# Patient Record
Sex: FEMALE | Race: WHITE | NOT HISPANIC OR LATINO | Employment: UNEMPLOYED | URBAN - METROPOLITAN AREA
[De-identification: names, ages, dates, MRNs, and addresses within clinical notes are randomized per-mention and may not be internally consistent; named-entity substitution may affect disease eponyms.]

---

## 2018-04-11 ENCOUNTER — HOSPITAL ENCOUNTER (EMERGENCY)
Facility: HOSPITAL | Age: 43
Discharge: HOME | End: 2018-04-11
Attending: EMERGENCY MEDICINE

## 2018-04-11 VITALS
OXYGEN SATURATION: 99 % | HEART RATE: 83 BPM | SYSTOLIC BLOOD PRESSURE: 143 MMHG | BODY MASS INDEX: 23.92 KG/M2 | HEIGHT: 62 IN | RESPIRATION RATE: 18 BRPM | WEIGHT: 130 LBS | DIASTOLIC BLOOD PRESSURE: 92 MMHG | TEMPERATURE: 98 F

## 2018-04-11 DIAGNOSIS — M25.539 WRIST PAIN: ICD-10-CM

## 2018-04-11 DIAGNOSIS — S62.646A CLOSED NONDISPLACED FRACTURE OF PROXIMAL PHALANX OF RIGHT LITTLE FINGER, INITIAL ENCOUNTER: Primary | ICD-10-CM

## 2018-04-11 PROCEDURE — 99283 EMERGENCY DEPT VISIT LOW MDM: CPT | Mod: 25

## 2018-04-11 PROCEDURE — 26720 TREAT FINGER FRACTURE EACH: CPT

## 2018-04-11 PROCEDURE — 25000003 PHARM REV CODE 250: Performed by: NURSE PRACTITIONER

## 2018-04-11 PROCEDURE — 29130 APPL FINGER SPLINT STATIC: CPT

## 2018-04-11 RX ORDER — HYDROCODONE BITARTRATE AND ACETAMINOPHEN 5; 325 MG/1; MG/1
1 TABLET ORAL EVERY 4 HOURS PRN
Qty: 12 TABLET | Refills: 0 | Status: SHIPPED | OUTPATIENT
Start: 2018-04-11 | End: 2018-04-21

## 2018-04-11 RX ORDER — HYDROCODONE BITARTRATE AND ACETAMINOPHEN 5; 325 MG/1; MG/1
1 TABLET ORAL
Status: COMPLETED | OUTPATIENT
Start: 2018-04-11 | End: 2018-04-11

## 2018-04-11 RX ADMIN — HYDROCODONE BITARTRATE AND ACETAMINOPHEN 1 TABLET: 5; 325 TABLET ORAL at 12:04

## 2018-04-11 NOTE — ED TRIAGE NOTES
Pt reports falling last Thursday with immediate pain to R outer hand at base of 4th and 5th finger. Pt reports she has been taking ibuprofen with no relief with last ibuprofen 2 tablets this morning. Pt reports hitting 5th finger today with worsening pain. Normal temp/color/sensation to distal 5th finger. Pain with movement. Pt reports pain to R wrist as well. Slight swelling noted.

## 2018-04-11 NOTE — ED PROVIDER NOTES
Encounter Date: 4/11/2018    SCRIBE #1 NOTE: I, Jude Esquivel, am scribing for, and in the presence of,  Leann Peralta NP. I have scribed the entire note.       History     Chief Complaint   Patient presents with    Hand Injury     rt. 5 th finger pain  , fall last week        04/11/2018 11:15 AM     Chief complaint: Hand injury       Karen Gomez is a 43 y.o. female with a non-significant medical history who presents to the ED for a right hand pain with onset 6 days ago. Patient states that she landed on her right 5th finger status post mechanical fall from ground level. She relays that she landed on the hand and is having pain to the right wrist and hand that is worse with movement. Patient reports that she then felt a crack today to her right 4th finger. Patient states that she has diffuse swelling to the hand. Patient relays that the swelling is worse to the 5th finger. Patient denies sensory deficits, motor deficits, LOC, headache, SOB, chest pain, abdominal pain, and vomiting.                     The history is provided by the patient.     Review of patient's allergies indicates:  No Known Allergies  History reviewed. No pertinent past medical history.  Past Surgical History:   Procedure Laterality Date    FINGER SURGERY      left hand middle     History reviewed. No pertinent family history.  Social History   Substance Use Topics    Smoking status: Current Every Day Smoker     Packs/day: 1.00     Years: 12.00     Types: Cigarettes    Smokeless tobacco: Never Used    Alcohol use 1.2 oz/week     2 Glasses of wine per week     Review of Systems   Constitutional: Negative for chills and fever.   HENT: Negative for congestion, rhinorrhea and sore throat.    Eyes: Negative for pain and redness.   Respiratory: Negative for cough and shortness of breath.    Cardiovascular: Negative for chest pain and palpitations.   Gastrointestinal: Negative for abdominal pain, diarrhea and nausea.   Genitourinary: Negative  for dysuria, flank pain, frequency, hematuria and urgency.   Musculoskeletal: Positive for arthralgias (hand pain ). Negative for gait problem, myalgias and neck pain.   Skin: Negative for rash.   Neurological: Negative for dizziness, light-headedness and headaches.       Physical Exam     Initial Vitals [04/11/18 0958]   BP Pulse Resp Temp SpO2   (!) 143/92 83 18 98.1 °F (36.7 °C) 99 %      MAP       109         Physical Exam    Nursing note and vitals reviewed.  Constitutional: She appears well-developed and well-nourished. She is not diaphoretic. No distress.   HENT:   Head: Normocephalic and atraumatic.   Neck: Normal range of motion.   Cardiovascular: Normal rate, regular rhythm and normal heart sounds.   Pulmonary/Chest: Breath sounds normal. She has no wheezes. She has no rhonchi. She has no rales.   Abdominal: Soft. Bowel sounds are normal. There is no tenderness.   Musculoskeletal: Normal range of motion. She exhibits tenderness.   Right hand: Tenderness to palpation to the 5th digit and the MCP joint. Patient has decreased ROM secondary to pain.  Swelling noted to the 5th digit.   Unable to fully evaluate tendon function of 5th digit due to patient's pain.    Neurological: She is alert.   Skin: Skin is warm and dry. Capillary refill takes less than 2 seconds.   Psychiatric: She has a normal mood and affect.         ED Course   Procedures  Labs Reviewed - No data to display     Imaging Results    None            Medical Decision Making:   History:   Old Medical Records: I decided to obtain old medical records.  Clinical Tests:   Radiological Study: Ordered and Reviewed       APC / Resident Notes:   Karen Gomez is a 43 year old female presenting to the ED with c/o right 5th digit pain after a fall that occurred one week ago. Patient has decreased ROM to the digit due to pain. I am unable to fully evaluate tendon function and explained the possibility of tendon injury. Patient is noted to have a fracture  of the proximal 5th phalanx and will require orthopedic follow up. She was placed in a metal finger splint and her information was faxed to [facility] for referral. She was instructed to contact [facility] as well for follow up. Specific return precautions discussed and patient verbalized understanding. Based on my clinical evaluation, I do not appreciate any immediate, emergent, or life threatening condition or etiology that warrants additional workup today and feel that the patient can be discharged with close follow up care.          Scribe Attestation:   Scribe #1: I performed the above scribed service and the documentation accurately describes the services I performed. I attest to the accuracy of the note.    I, ADINA Jones, personally performed the services described in this documentation. All medical record entries made by the scribe were at my direction and in my presence.  I have reviewed the chart and agree that the record reflects my personal performance and is accurate and complete. ADINA Jones.  12:24 PM 04/11/2018             Clinical Impression:     1. Closed nondisplaced fracture of proximal phalanx of right little finger, initial encounter    2. Wrist pain          Disposition:   Disposition: Discharged  Condition: Stable                        Leann Peralta NP  04/11/18 1226

## 2018-09-29 ENCOUNTER — HOSPITAL ENCOUNTER (EMERGENCY)
Facility: HOSPITAL | Age: 43
Discharge: HOME | End: 2018-09-29
Attending: EMERGENCY MEDICINE

## 2018-09-29 VITALS
OXYGEN SATURATION: 100 % | BODY MASS INDEX: 23.92 KG/M2 | TEMPERATURE: 97 F | HEART RATE: 96 BPM | HEIGHT: 62 IN | SYSTOLIC BLOOD PRESSURE: 138 MMHG | RESPIRATION RATE: 20 BRPM | DIASTOLIC BLOOD PRESSURE: 101 MMHG | WEIGHT: 130 LBS

## 2018-09-29 DIAGNOSIS — S61.012A LACERATION OF LEFT THUMB WITHOUT FOREIGN BODY WITHOUT DAMAGE TO NAIL, INITIAL ENCOUNTER: Primary | ICD-10-CM

## 2018-09-29 PROCEDURE — 25000003 PHARM REV CODE 250: Performed by: EMERGENCY MEDICINE

## 2018-09-29 PROCEDURE — 12001 RPR S/N/AX/GEN/TRNK 2.5CM/<: CPT

## 2018-09-29 PROCEDURE — 99283 EMERGENCY DEPT VISIT LOW MDM: CPT | Mod: 25

## 2018-09-29 RX ORDER — CEPHALEXIN 500 MG/1
500 CAPSULE ORAL EVERY 8 HOURS
Qty: 15 CAPSULE | Refills: 0 | Status: SHIPPED | OUTPATIENT
Start: 2018-09-29 | End: 2018-10-04

## 2018-09-29 RX ORDER — IBUPROFEN 600 MG/1
600 TABLET ORAL EVERY 6 HOURS PRN
Qty: 20 TABLET | Refills: 0 | Status: SHIPPED | OUTPATIENT
Start: 2018-09-29

## 2018-09-29 RX ORDER — LIDOCAINE HYDROCHLORIDE 10 MG/ML
10 INJECTION INFILTRATION; PERINEURAL
Status: COMPLETED | OUTPATIENT
Start: 2018-09-29 | End: 2018-09-29

## 2018-09-29 RX ADMIN — LIDOCAINE HYDROCHLORIDE 10 ML: 10 INJECTION, SOLUTION INFILTRATION; PERINEURAL at 10:09

## 2018-09-30 NOTE — ED PROVIDER NOTES
Encounter Date: 9/29/2018       History     Chief Complaint   Patient presents with    Laceration     left thumb cut with skill saw     33-year-old female presents with a sudden onset of sharp pain to her right thumb that occurred when she cut it on a circular saw.  Pain worse with movement.  He has been constant.          Review of patient's allergies indicates:  No Known Allergies  History reviewed. No pertinent past medical history.  Past Surgical History:   Procedure Laterality Date    FINGER SURGERY      left hand middle     History reviewed. No pertinent family history.  Social History     Tobacco Use    Smoking status: Current Every Day Smoker     Packs/day: 1.00     Years: 12.00     Pack years: 12.00     Types: Cigarettes    Smokeless tobacco: Never Used   Substance Use Topics    Alcohol use: Yes     Alcohol/week: 1.2 oz     Types: 2 Glasses of wine per week    Drug use: No     Review of Systems   Constitutional: Negative for activity change and appetite change.   HENT: Negative for rhinorrhea.    Respiratory: Negative for shortness of breath.    Cardiovascular: Negative for chest pain.   Skin: Positive for wound.       Physical Exam     Initial Vitals [09/29/18 2217]   BP Pulse Resp Temp SpO2   (!) 138/101 96 20 97.4 °F (36.3 °C) 100 %      MAP       --         Physical Exam    Constitutional: Vital signs are normal. She appears well-developed and well-nourished. She does not have a sickly appearance.   HENT:   Head: Normocephalic and atraumatic.   Eyes: Conjunctivae and EOM are normal.   Neck: Normal range of motion.   Neurological: She is alert and oriented to person, place, and time.   Skin: Skin is warm, dry and intact. Capillary refill takes less than 2 seconds.   Patient has a laceration on the left thumb dorsal side just adjacent to the nail bed that is approximately 2 cm.  Well-approximated on its own.  No nail bed injury seen.         ED Course   Lac Repair  Date/Time: 9/29/2018 11:35  PM  Performed by: Hu Junior DO  Authorized by: Hu Junior DO   Consent Done: Not Needed  Body area: upper extremity  Location details: left thumb  Laceration length: 2 cm  Tendon involvement: none  Nerve involvement: none  Vascular damage: no  Anesthesia: digital block    Anesthesia:  Local Anesthetic: lidocaine 1% without epinephrine  Patient sedated: no  Irrigation solution: saline  Irrigation method: syringe  Amount of cleaning: standard  Debridement: none  Degree of undermining: none  Skin closure: 4-0 nylon  Number of sutures: 2  Technique: simple  Approximation: close  Approximation difficulty: simple  Dressing: 4x4 sterile gauze  Patient tolerance: Patient tolerated the procedure well with no immediate complications        Labs Reviewed - No data to display       Imaging Results          X-Ray Hand 3 view Left (In process)               X-Rays:   Independently Interpreted Readings:   Other Readings:  X-ray of the left hand shows no fracture or deformity of the hand or specifically the thumb.    Medical Decision Making:   ED Management:  43-year-old female with laceration.  X-rays show no bone fracture abnormalities.  No foreign body seen.  Wound repaired.  Will discharge with return precautions given.    Patient discharged home in stable condition. Diagnosis and treatment plan explained to patient. I have answered all questions and the patient is satisfied with the plan of care. The patient demonstrates understanding of the care plan. This is the extent to the patients complaints today here in the emergency department.                      Clinical Impression:     1. Laceration of left thumb without foreign body without damage to nail, initial encounter                                  Hu Junior DO  09/29/18 [id number]

## 2021-04-28 ENCOUNTER — HOSPITAL ENCOUNTER (EMERGENCY)
Facility: HOSPITAL | Age: 46
Discharge: HOME | End: 2021-04-29
Attending: EMERGENCY MEDICINE
Payer: MEDICAID

## 2021-04-28 VITALS
SYSTOLIC BLOOD PRESSURE: 166 MMHG | HEIGHT: 62 IN | OXYGEN SATURATION: 98 % | DIASTOLIC BLOOD PRESSURE: 99 MMHG | WEIGHT: 150 LBS | BODY MASS INDEX: 27.6 KG/M2 | TEMPERATURE: 98 F | HEART RATE: 103 BPM | RESPIRATION RATE: 18 BRPM

## 2021-04-28 DIAGNOSIS — S41.111A ARM LACERATION, RIGHT, INITIAL ENCOUNTER: Primary | ICD-10-CM

## 2021-04-28 DIAGNOSIS — T14.8XXA PUNCTURE WOUND: ICD-10-CM

## 2021-04-28 PROCEDURE — 90471 IMMUNIZATION ADMIN: CPT | Performed by: EMERGENCY MEDICINE

## 2021-04-28 PROCEDURE — 90715 TDAP VACCINE 7 YRS/> IM: CPT | Performed by: EMERGENCY MEDICINE

## 2021-04-28 PROCEDURE — 63600175 PHARM REV CODE 636 W HCPCS: Performed by: EMERGENCY MEDICINE

## 2021-04-28 PROCEDURE — 25000003 PHARM REV CODE 250: Performed by: EMERGENCY MEDICINE

## 2021-04-28 PROCEDURE — 12001 RPR S/N/AX/GEN/TRNK 2.5CM/<: CPT

## 2021-04-28 PROCEDURE — 99284 EMERGENCY DEPT VISIT MOD MDM: CPT | Mod: 25

## 2021-04-28 PROCEDURE — 96372 THER/PROPH/DIAG INJ SC/IM: CPT | Mod: 59

## 2021-04-28 RX ORDER — LIDOCAINE HYDROCHLORIDE AND EPINEPHRINE 10; 10 MG/ML; UG/ML
10 INJECTION, SOLUTION INFILTRATION; PERINEURAL
Status: COMPLETED | OUTPATIENT
Start: 2021-04-28 | End: 2021-04-28

## 2021-04-28 RX ORDER — HYDROMORPHONE HYDROCHLORIDE 2 MG/ML
1 INJECTION, SOLUTION INTRAMUSCULAR; INTRAVENOUS; SUBCUTANEOUS
Status: COMPLETED | OUTPATIENT
Start: 2021-04-28 | End: 2021-04-28

## 2021-04-28 RX ADMIN — HYDROMORPHONE HYDROCHLORIDE 1 MG: 2 INJECTION INTRAMUSCULAR; INTRAVENOUS; SUBCUTANEOUS at 09:04

## 2021-04-28 RX ADMIN — LIDOCAINE HYDROCHLORIDE,EPINEPHRINE BITARTRATE 10 ML: 10; .01 INJECTION, SOLUTION INFILTRATION; PERINEURAL at 09:04

## 2021-04-28 RX ADMIN — CLOSTRIDIUM TETANI TOXOID ANTIGEN (FORMALDEHYDE INACTIVATED), CORYNEBACTERIUM DIPHTHERIAE TOXOID ANTIGEN (FORMALDEHYDE INACTIVATED), BORDETELLA PERTUSSIS TOXOID ANTIGEN (GLUTARALDEHYDE INACTIVATED), BORDETELLA PERTUSSIS FILAMENTOUS HEMAGGLUTININ ANTIGEN (FORMALDEHYDE INACTIVATED), BORDETELLA PERTUSSIS PERTACTIN ANTIGEN, AND BORDETELLA PERTUSSIS FIMBRIAE 2/3 ANTIGEN 0.5 ML: 5; 2; 2.5; 5; 3; 5 INJECTION, SUSPENSION INTRAMUSCULAR at 09:04

## 2021-04-29 ENCOUNTER — TELEPHONE (OUTPATIENT)
Dept: ORTHOPEDICS | Facility: CLINIC | Age: 46
End: 2021-04-29

## 2021-09-23 ENCOUNTER — OFFICE VISIT (OUTPATIENT)
Dept: URGENT CARE | Facility: CLINIC | Age: 46
End: 2021-09-23
Payer: MEDICAID

## 2021-09-23 VITALS
SYSTOLIC BLOOD PRESSURE: 156 MMHG | HEIGHT: 62 IN | DIASTOLIC BLOOD PRESSURE: 111 MMHG | WEIGHT: 151 LBS | BODY MASS INDEX: 27.79 KG/M2 | OXYGEN SATURATION: 98 % | HEART RATE: 91 BPM | TEMPERATURE: 98 F

## 2021-09-23 DIAGNOSIS — K13.0 LIP ABSCESS: Primary | ICD-10-CM

## 2021-09-23 PROCEDURE — 99204 OFFICE O/P NEW MOD 45 MIN: CPT | Mod: S$GLB,,, | Performed by: NURSE PRACTITIONER

## 2021-09-23 PROCEDURE — 99204 PR OFFICE/OUTPT VISIT, NEW, LEVL IV, 45-59 MIN: ICD-10-PCS | Mod: S$GLB,,, | Performed by: NURSE PRACTITIONER

## 2021-09-23 RX ORDER — SULFAMETHOXAZOLE AND TRIMETHOPRIM 800; 160 MG/1; MG/1
1 TABLET ORAL 2 TIMES DAILY
Qty: 14 TABLET | Refills: 0 | Status: SHIPPED | OUTPATIENT
Start: 2021-09-23 | End: 2021-09-30

## 2022-07-07 ENCOUNTER — OFFICE VISIT (OUTPATIENT)
Dept: URGENT CARE | Facility: CLINIC | Age: 47
End: 2022-07-07
Payer: MEDICAID

## 2022-07-07 VITALS
HEART RATE: 90 BPM | BODY MASS INDEX: 28.37 KG/M2 | HEIGHT: 62 IN | WEIGHT: 154.19 LBS | OXYGEN SATURATION: 98 % | TEMPERATURE: 99 F | DIASTOLIC BLOOD PRESSURE: 90 MMHG | RESPIRATION RATE: 16 BRPM | SYSTOLIC BLOOD PRESSURE: 160 MMHG

## 2022-07-07 DIAGNOSIS — S80.861A INSECT BITE OF RIGHT LOWER LEG, INITIAL ENCOUNTER: Primary | ICD-10-CM

## 2022-07-07 DIAGNOSIS — L03.115 CELLULITIS OF RIGHT LOWER EXTREMITY: ICD-10-CM
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
## 2022-07-07 PROCEDURE — 1160F RVW MEDS BY RX/DR IN RCRD: CPT | Mod: CPTII,S$GLB,, | Performed by: NURSE PRACTITIONER

## 2022-07-07 PROCEDURE — 3077F PR MOST RECENT SYSTOLIC BLOOD PRESSURE >= 140 MM HG: ICD-10-PCS | Mod: CPTII,S$GLB,, | Performed by: NURSE PRACTITIONER

## 2022-07-07 PROCEDURE — 3080F PR MOST RECENT DIASTOLIC BLOOD PRESSURE >= 90 MM HG: ICD-10-PCS | Mod: CPTII,S$GLB,, | Performed by: NURSE PRACTITIONER

## 2022-07-07 PROCEDURE — 1159F PR MEDICATION LIST DOCUMENTED IN MEDICAL RECORD: ICD-10-PCS | Mod: CPTII,S$GLB,, | Performed by: NURSE PRACTITIONER

## 2022-07-07 PROCEDURE — 3008F PR BODY MASS INDEX (BMI) DOCUMENTED: ICD-10-PCS | Mod: CPTII,S$GLB,, | Performed by: NURSE PRACTITIONER

## 2022-07-07 PROCEDURE — 3080F DIAST BP >= 90 MM HG: CPT | Mod: CPTII,S$GLB,, | Performed by: NURSE PRACTITIONER

## 2022-07-07 PROCEDURE — 99213 PR OFFICE/OUTPT VISIT, EST, LEVL III, 20-29 MIN: ICD-10-PCS | Mod: S$GLB,,, | Performed by: NURSE PRACTITIONER

## 2022-07-07 PROCEDURE — 99213 OFFICE O/P EST LOW 20 MIN: CPT | Mod: S$GLB,,, | Performed by: NURSE PRACTITIONER

## 2022-07-07 PROCEDURE — 1160F PR REVIEW ALL MEDS BY PRESCRIBER/CLIN PHARMACIST DOCUMENTED: ICD-10-PCS | Mod: CPTII,S$GLB,, | Performed by: NURSE PRACTITIONER

## 2022-07-07 PROCEDURE — 3077F SYST BP >= 140 MM HG: CPT | Mod: CPTII,S$GLB,, | Performed by: NURSE PRACTITIONER

## 2022-07-07 PROCEDURE — 3008F BODY MASS INDEX DOCD: CPT | Mod: CPTII,S$GLB,, | Performed by: NURSE PRACTITIONER

## 2022-07-07 PROCEDURE — 1159F MED LIST DOCD IN RCRD: CPT | Mod: CPTII,S$GLB,, | Performed by: NURSE PRACTITIONER

## 2022-07-07 RX ORDER — SULFAMETHOXAZOLE AND TRIMETHOPRIM 800; 160 MG/1; MG/1
1 TABLET ORAL 2 TIMES DAILY
Qty: 20 TABLET | Refills: 0 | Status: SHIPPED | OUTPATIENT
Start: 2022-07-07 | End: 2022-07-17

## 2022-07-07 RX ORDER — MUPIROCIN 20 MG/G
OINTMENT TOPICAL 3 TIMES DAILY
Qty: 30 G | Refills: 0 | Status: SHIPPED | OUTPATIENT
Start: 2022-07-07

## 2022-07-07 NOTE — PROGRESS NOTES
"Subjective:       Patient ID: Karen Gomez is a 47 y.o. female.    Vitals:  height is 5' 2" (1.575 m) and weight is 69.9 kg (154 lb 3.2 oz). Her temperature is 98.6 °F (37 °C). Her blood pressure is 160/90 (abnormal) and her pulse is 90. Her respiration is 16 and oxygen saturation is 98%.     Chief Complaint: Insect Bite (R lower leg)    Insect Bite  The current episode started in the past 7 days (2 days ago). The problem occurs constantly. The problem has been gradually worsening. Associated symptoms include headaches and nausea. She has tried NSAIDs for the symptoms. The treatment provided mild relief.       Gastrointestinal: Positive for nausea.   Neurological: Positive for headaches.       Objective:      Physical Exam   Constitutional: She is oriented to person, place, and time.   HENT:   Head: Normocephalic and atraumatic.   Nose: Nose normal.   Mouth/Throat: Oropharynx is clear.   Eyes: Conjunctivae are normal.   Neck: Neck supple.   Cardiovascular: Normal rate, regular rhythm, normal heart sounds and normal pulses.   Pulmonary/Chest: Effort normal and breath sounds normal.   Abdominal: Normal appearance. Soft.   Musculoskeletal: Normal range of motion.         General: Normal range of motion.   Neurological: She is alert and oriented to person, place, and time.   Skin: Capillary refill takes 2 to 3 seconds.        Psychiatric: Her behavior is normal. Mood normal.   Nursing note and vitals reviewed.        Assessment:       1. Insect bite of right lower leg, initial encounter    2. Cellulitis of right lower extremity          Plan:         Insect bite of right lower leg, initial encounter  -     sulfamethoxazole-trimethoprim 800-160mg (BACTRIM DS) 800-160 mg Tab; Take 1 tablet by mouth 2 (two) times daily. for 10 days  Dispense: 20 tablet; Refill: 0  -     mupirocin (BACTROBAN) 2 % ointment; Apply topically 3 (three) times daily.  Dispense: 30 g; Refill: 0    Cellulitis of right lower extremity  -     " sulfamethoxazole-trimethoprim 800-160mg (BACTRIM DS) 800-160 mg Tab; Take 1 tablet by mouth 2 (two) times daily. for 10 days  Dispense: 20 tablet; Refill: 0  -     mupirocin (BACTROBAN) 2 % ointment; Apply topically 3 (three) times daily.  Dispense: 30 g; Refill: 0    Bactrim DS 1 twice daily with food x 10 days  Mupirocin ointment to insect bites 3 times daily  Keep site clean and dry  Follow up if redness and swelling do not improve                show

## 2022-07-07 NOTE — PATIENT INSTRUCTIONS
Bactrim DS 1 twice daily with food x 10 days  Mupirocin ointment to insect bites 3 times daily  Keep site clean and dry  Follow up if redness and swelling do not improve

## 2023-06-06 ENCOUNTER — HOSPITAL ENCOUNTER (EMERGENCY)
Facility: HOSPITAL | Age: 48
Discharge: HOME | End: 2023-06-06
Attending: EMERGENCY MEDICINE
Payer: MEDICAID

## 2023-06-06 VITALS
DIASTOLIC BLOOD PRESSURE: 98 MMHG | SYSTOLIC BLOOD PRESSURE: 180 MMHG | HEIGHT: 62 IN | BODY MASS INDEX: 28.71 KG/M2 | HEART RATE: 96 BPM | TEMPERATURE: 98 F | WEIGHT: 156 LBS | RESPIRATION RATE: 20 BRPM | OXYGEN SATURATION: 98 %

## 2023-06-06 DIAGNOSIS — S61.012A LACERATION OF LEFT THUMB WITHOUT FOREIGN BODY WITHOUT DAMAGE TO NAIL, INITIAL ENCOUNTER: Primary | ICD-10-CM

## 2023-06-06 PROCEDURE — 99283 EMERGENCY DEPT VISIT LOW MDM: CPT | Mod: 25

## 2023-06-06 PROCEDURE — 12001 RPR S/N/AX/GEN/TRNK 2.5CM/<: CPT

## 2023-06-06 PROCEDURE — 25000003 PHARM REV CODE 250: Performed by: NURSE PRACTITIONER

## 2023-06-06 RX ORDER — LIDOCAINE HYDROCHLORIDE 10 MG/ML
10 INJECTION INFILTRATION; PERINEURAL
Status: COMPLETED | OUTPATIENT
Start: 2023-06-06 | End: 2023-06-06

## 2023-06-06 RX ADMIN — LIDOCAINE HYDROCHLORIDE 10 ML: 10 INJECTION, SOLUTION INFILTRATION; PERINEURAL at 05:06

## 2023-06-06 RX ADMIN — BACITRACIN ZINC, NEOMYCIN SULFATE, POLYMYXIN B SULFATE 1 EACH: 3.5; 5000; 4 OINTMENT TOPICAL at 05:06

## 2023-06-06 NOTE — ED PROVIDER NOTES
"Encounter Date: 6/6/2023    SCRIBE #1 NOTE: I, Km Harvey, am scribing for, and in the presence of,  Kelsey Barton PA-C.     History     Chief Complaint   Patient presents with    Hand Injury     Patient cut her thumb on the left hand with a saw and is in pain, laceration is at the tip states it got caught in the saw, nail intact bleeding controlled      Time seen by provider: 5:19 PM on 06/06/2023    Karen Gomez is a 48 y.o. female who presents to the ED with an onset of a cut on the left thumb with pain when she accidentally cut the tip of the thumb with a saw 2 hours ago. The patient describes the pain as "shooting pain." The patient reports having a Tetanus vaccination last year. The patient denies any other symptoms at this time. The patient has no pertinent PMHx or PSHx.      The history is provided by the patient.   Review of patient's allergies indicates:  No Known Allergies  No past medical history on file.  Past Surgical History:   Procedure Laterality Date    FINGER SURGERY      left hand middle     No family history on file.  Social History     Tobacco Use    Smoking status: Every Day     Packs/day: 1.00     Years: 12.00     Pack years: 12.00     Types: Cigarettes    Smokeless tobacco: Never   Substance Use Topics    Alcohol use: Yes     Alcohol/week: 2.0 standard drinks     Types: 2 Glasses of wine per week    Drug use: No     Review of Systems   Constitutional:  Negative for fever.   Respiratory:  Negative for shortness of breath.    Genitourinary:  Negative for flank pain.   Musculoskeletal:  Negative for gait problem.        Positive for pain at the left thumb.   Skin:  Positive for wound (cut to the left thumb).   Neurological:  Negative for weakness.   Psychiatric/Behavioral:  Negative for confusion.      Physical Exam     Initial Vitals   BP Pulse Resp Temp SpO2   06/06/23 1543 06/06/23 1543 06/06/23 1543 06/06/23 1544 06/06/23 1543   (!) 180/98 96 20 98 °F (36.7 °C) 98 %      MAP     "   --                Physical Exam    Nursing note and vitals reviewed.  Constitutional: She appears well-developed and well-nourished. She is not diaphoretic. No distress.   HENT:   Head: Normocephalic and atraumatic.   Cardiovascular:  Intact distal pulses.           Musculoskeletal:         General: Tenderness present. Normal range of motion.      Comments: Small, < 1 cm, superficial laceration to the distal left thumb with no nailbed involvement     Neurological: She is alert and oriented to person, place, and time. She has normal strength. No sensory deficit.   Skin: Skin is warm and dry. No rash and no abscess noted. No erythema.   Psychiatric: She has a normal mood and affect.       ED Course   Lac Repair    Date/Time: 6/6/2023 6:13 PM  Performed by: Kelsey Barton PA-C  Authorized by: Will Campbell MD     Consent:     Consent obtained:  Verbal    Consent given by:  Patient    Risks, benefits, and alternatives were discussed: yes      Risks discussed:  Infection and pain    Alternatives discussed:  No treatment and delayed treatment  Universal protocol:     Procedure explained and questions answered to patient or proxy's satisfaction: yes      Imaging studies available: yes      Patient identity confirmed:  Verbally with patient  Anesthesia:     Anesthesia method:  None  Laceration details:     Location:  Finger    Finger location:  L thumb    Length (cm):  1  Exploration:     Limited defect created (wound extended): no      Imaging obtained: x-ray    Treatment:     Area cleansed with:  Eliazar    Amount of cleaning:  Standard    Debridement:  None    Undermining:  None    Scar revision: no    Skin repair:     Repair method:  Tissue adhesive  Approximation:     Approximation:  Close  Repair type:     Repair type:  Simple  Post-procedure details:     Procedure completion:  Tolerated well, no immediate complications  Labs Reviewed - No data to display       Imaging Results              X-Ray  Finger 2 or More Views Left (Final result)  Result time 06/06/23 16:04:25      Final result by Nikky James MD (06/06/23 16:04:25)                   Impression:      No acute abnormality.      Electronically signed by: Nikky James  Date:    06/06/2023  Time:    16:04               Narrative:    EXAMINATION:  XR FINGER 2 OR MORE VIEWS LEFT    CLINICAL HISTORY:  laceration;    TECHNIQUE:  Three views of the left thumb were obtained    COMPARISON:  Hand x-rays 09/29/2018    FINDINGS:  There is no fracture, dislocation or radiopaque foreign body.  There are minor degenerative changes at the base of the thumb.                                       Medications   LIDOcaine HCL 10 mg/ml (1%) injection 10 mL (10 mLs Infiltration Given 6/6/23 1722)   neomycin-bacitracnZn-polymyxnB packet (1 each Topical (Top) Given 6/6/23 1722)     Medical Decision Making:   History:   Old Medical Records: I decided to obtain old medical records.  Clinical Tests:   Radiological Study: Ordered and Reviewed     APC / Resident Notes:   Urgent evaluation of a 48-year-old female who presents with laceration to the left thumb.  X-ray shows no acute abnormalities.  The laceration is superficial.  It was cleaned and closed with Dermabond.  Wound care discussed with patient.   Scribe Attestation:   Scribe #1: I performed the above scribed service and the documentation accurately describes the services I performed. I attest to the accuracy of the note.                 I, Kelsey Barton PA-C, personally performed the services described in this documentation. All medical record entries made by the scribe were at my direction and in my presence.  I have reviewed the chart and agree that the record reflects my personal performance and is accurate and complete. Kelsey Barton PA-C.  7:47 PM 06/06/2023      Clinical Impression:   Final diagnoses:  [S61.012A] Laceration of left thumb without foreign body without damage to nail, initial  encounter (Primary)        ED Disposition Condition    Discharge Stable          ED Prescriptions    None       Follow-up Information       Follow up With Specialties Details Why Contact Info    [facility] Appointment Line  Schedule an appointment as soon as possible for a visit  For follow up if you don't have a primary care provider [phone]    [facility] Emergency Dept Emergency Medicine  As needed [address] [place] [id number]  [phone]             Kelsey Barton PA-C  06/06/23 1947

## 2023-06-06 NOTE — ED NOTES
Notified Kelsey HASSAN that wound to thumb is oozing slightly, orders given to put dressing on it and send pt home. Adaptic dressing placed with koban lightly to thumb.

## 2023-06-06 NOTE — FIRST PROVIDER EVALUATION
Emergency Department TeleTriage Encounter Note      CHIEF COMPLAINT    Chief Complaint   Patient presents with    Hand Injury     Patient cut her thumb on the left hand with a saw and is in pain, laceration is at the tip states it got caught in the saw, nail intact bleeding controlled      VITAL SIGNS   Initial Vitals   BP Pulse Resp Temp SpO2   06/06/23 1543 06/06/23 1543 06/06/23 1543 06/06/23 1544 06/06/23 1543   (!) 180/98 96 20 98 °F (36.7 °C) 98 %      MAP       --                 ALLERGIES    Review of patient's allergies indicates:  No Known Allergies    PROVIDER TRIAGE NOTE  This is a teletriage evaluation of a 48 y.o. female presenting to the ED with c/o laceration to left thumb with a saw.  Last tetanus 4/28/201. Limited physical exam via telehealth: The patient is awake, alert, answering questions appropriately and is not in respiratory distress.  As the Teletriage provider, I performed an initial assessment and ordered appropriate labs and imaging studies, if any, to facilitate the patient's care once placed in the ED. Once a room is available, care and a full evaluation will be completed by an alternate ED provider.  Any additional orders and the final disposition will be determined by that provider.  All imaging and labs will not be followed-up by the Teletriage Team, including myself.        ORDERS  Labs Reviewed - No data to display    ED Orders (720h ago, onward)      Start Ordered     Status Ordering Provider    06/07/23 0600 06/06/23 1546  Wound care routine - Clean wound  Daily        Comments: Clean Wound    Ordered LINDA MARSH    06/07/23 0600 06/06/23 1546  Wound care routine - Irrigate wound  Daily        Comments: Irrigate Wound    Ordered LINDA MARSH    06/07/23 0600 06/06/23 1546  Wound care routine - Sterile Gloves to Bedside  Daily        Comments: Provide sterile gloves to bedside    Ordered LINDA MARSH    06/06/23 1600 06/06/23 1546  LIDOcaine HCL 10 mg/ml (1%) injection 10 mL   ED 1 Time         Ordered LINDA MARSH    06/06/23 1600 06/06/23 1546  neomycin-bacitracnZn-polymyxnB packet  ED 1 Time         Ordered LINDA MARSH    06/06/23 1547 06/06/23 1546  X-Ray Finger 2 or More Views Left  1 time imaging         Ordered LINDA MARSH    06/06/23 1547 06/06/23 1546  Provide suture tray to patient bedside  Once         Ordered LINDA MARSH    06/06/23 1547 06/06/23 1546  Change dressing - apply dry sterile dressing  Once        Comments: Apply dry sterile dressing.    Ordered LINDA MARSH JENNIFER              Virtual Visit Note: The provider triage portion of this emergency department evaluation and documentation was performed via [employer], a HIPAA-compliant telemedicine application, in concert with a tele-presenter in the room. A face to face patient evaluation with one of my colleagues will occur once the patient is placed in an emergency department room.      DISCLAIMER: This note was prepared with [employer] voice recognition transcription software. Garbled syntax, mangled pronouns, and other bizarre constructions may be attributed to that software system.

## 2025-04-12 ENCOUNTER — HOSPITAL ENCOUNTER (EMERGENCY)
Facility: HOSPITAL | Age: 50
Discharge: HOME | End: 2025-04-12
Attending: STUDENT IN AN ORGANIZED HEALTH CARE EDUCATION/TRAINING PROGRAM
Payer: COMMERCIAL

## 2025-04-12 VITALS
SYSTOLIC BLOOD PRESSURE: 177 MMHG | DIASTOLIC BLOOD PRESSURE: 106 MMHG | RESPIRATION RATE: 20 BRPM | BODY MASS INDEX: 30.36 KG/M2 | HEART RATE: 95 BPM | TEMPERATURE: 99 F | OXYGEN SATURATION: 99 % | HEIGHT: 62 IN | WEIGHT: 165 LBS

## 2025-04-12 DIAGNOSIS — L03.114 CELLULITIS OF HAND, LEFT: Primary | ICD-10-CM

## 2025-04-12 DIAGNOSIS — M79.642 PAIN OF LEFT HAND: ICD-10-CM

## 2025-04-12 PROCEDURE — 25000003 PHARM REV CODE 250: Performed by: STUDENT IN AN ORGANIZED HEALTH CARE EDUCATION/TRAINING PROGRAM

## 2025-04-12 PROCEDURE — 99284 EMERGENCY DEPT VISIT MOD MDM: CPT

## 2025-04-12 RX ORDER — CIPROFLOXACIN 500 MG/1
750 TABLET ORAL EVERY 12 HOURS
Qty: 30 TABLET | Refills: 0 | Status: SHIPPED | OUTPATIENT
Start: 2025-04-12 | End: 2025-04-12

## 2025-04-12 RX ORDER — SULFAMETHOXAZOLE AND TRIMETHOPRIM 800; 160 MG/1; MG/1
1 TABLET ORAL
Status: COMPLETED | OUTPATIENT
Start: 2025-04-12 | End: 2025-04-12

## 2025-04-12 RX ORDER — HYDROCODONE BITARTRATE AND ACETAMINOPHEN 5; 325 MG/1; MG/1
1 TABLET ORAL
Refills: 0 | Status: COMPLETED | OUTPATIENT
Start: 2025-04-12 | End: 2025-04-12

## 2025-04-12 RX ORDER — SULFAMETHOXAZOLE AND TRIMETHOPRIM 800; 160 MG/1; MG/1
1 TABLET ORAL 2 TIMES DAILY
Qty: 14 TABLET | Refills: 0 | Status: ON HOLD | OUTPATIENT
Start: 2025-04-12 | End: 2025-04-19 | Stop reason: HOSPADM

## 2025-04-12 RX ORDER — SULFAMETHOXAZOLE AND TRIMETHOPRIM 800; 160 MG/1; MG/1
1 TABLET ORAL 2 TIMES DAILY
Qty: 14 TABLET | Refills: 0 | Status: SHIPPED | OUTPATIENT
Start: 2025-04-12 | End: 2025-04-12

## 2025-04-12 RX ORDER — HYDROCODONE BITARTRATE AND ACETAMINOPHEN 7.5; 325 MG/1; MG/1
1 TABLET ORAL EVERY 8 HOURS PRN
Qty: 12 TABLET | Refills: 0 | Status: ON HOLD | OUTPATIENT
Start: 2025-04-12 | End: 2025-04-19 | Stop reason: HOSPADM

## 2025-04-12 RX ORDER — ONDANSETRON 4 MG/1
4 TABLET, ORALLY DISINTEGRATING ORAL EVERY 8 HOURS PRN
Qty: 12 TABLET | Refills: 0 | Status: SHIPPED | OUTPATIENT
Start: 2025-04-12 | End: 2025-04-12

## 2025-04-12 RX ORDER — CIPROFLOXACIN 500 MG/1
750 TABLET ORAL EVERY 12 HOURS
Qty: 30 TABLET | Refills: 0 | Status: ON HOLD | OUTPATIENT
Start: 2025-04-12 | End: 2025-04-19 | Stop reason: HOSPADM

## 2025-04-12 RX ORDER — ONDANSETRON 4 MG/1
4 TABLET, ORALLY DISINTEGRATING ORAL EVERY 8 HOURS PRN
Qty: 12 TABLET | Refills: 0 | Status: SHIPPED | OUTPATIENT
Start: 2025-04-12

## 2025-04-12 RX ADMIN — CIPROFOLXACIN 750 MG: 250 TABLET ORAL at 10:04

## 2025-04-12 RX ADMIN — SULFAMETHOXAZOLE AND TRIMETHOPRIM 1 TABLET: 800; 160 TABLET ORAL at 09:04

## 2025-04-12 RX ADMIN — HYDROCODONE BITARTRATE AND ACETAMINOPHEN 1 TABLET: 5; 325 TABLET ORAL at 09:04

## 2025-04-13 NOTE — ED PROVIDER NOTES
Encounter Date: 4/12/2025       History     Chief Complaint   Patient presents with    Abscess     To left hand for 3 days     50-year-old female presents for evaluation of left hand infection.  Started 3 days ago and currently worsening.  Patient does report saltwater exposure.  No associated purulent drainage.  No fever or chills    The history is provided by the patient.     Review of patient's allergies indicates:  No Known Allergies  History reviewed. No pertinent past medical history.  Past Surgical History:   Procedure Laterality Date    FINGER SURGERY      left hand middle     No family history on file.  Social History[1]  Review of Systems   All other systems reviewed and are negative.      Physical Exam     Initial Vitals [04/12/25 2118]   BP Pulse Resp Temp SpO2   (!) 187/104 98 18 98.6 °F (37 °C) 99 %      MAP       --         Physical Exam    Nursing note and vitals reviewed.  Constitutional: No distress.   HENT:   Head: Normocephalic.   Eyes: No scleral icterus.   Cardiovascular:  Normal rate.           Pulmonary/Chest: Effort normal. No stridor. No respiratory distress.   Abdominal: There is no guarding.   Musculoskeletal:      Comments: No sausage digit digit swelling, no fluctuance, does have some generalized left hand swelling compared to right.  No crepitus or hemorrhagic bullae     Neurological: She is alert.   Distal pulses 2+   Skin: No rash noted. There is erythema.         ED Course   Procedures  Labs Reviewed   HEPATITIS C ANTIBODY   HIV 1 / 2 ANTIBODY          Imaging Results    None          Medications   ciprofloxacin HCl tablet 750 mg (has no administration in time range)   sulfamethoxazole-trimethoprim 800-160mg per tablet 1 tablet (1 tablet Oral Given 4/12/25 2146)   HYDROcodone-acetaminophen 5-325 mg per tablet 1 tablet (1 tablet Oral Given 4/12/25 2146)     Medical Decision Making  50-year-old female presents with left hand pain and worsening hand infection.  No evidence of abscess  on exam or flexor tenosynovitis or any surgical abnormality at this point.  Patient has been exposed saltwater in [place].  Was my recommendation for admission for IV antibiotics to be most aggressive to prevent any progression of infection.  Patient reluctant for admission and IV antibiotics, at this time she prefers outpatient management.  We will treat with Bactrim for skin coverage, patient reports possible history of MRSA.  Also given saltwater exposure we will treat with Cipro.  Patient administered narcotic pain control here in ED for supportive care as well as oral antibiotics,  reviewed and medications sent tele pharmacy electronically for pain control and antibiotic control.  Patient provided strict return precautions for any worsening symptoms.  Patient also declined pregnancy test both medications or category C antibiotics.  She understands risks and benefits and has capacity to make her decision    Risk  Prescription drug management.               ED Course as of 04/12/25 2147   Sat Apr 12, 2025 2140 Patient declines pregnancy test.  Friend States due to her sexual orientation she can not be pregnant.  Patient understands risks and benefits to fetus if she is pregnant as discussed by myself and still would like to proceed without pregnancy test, has capacity to make her decision [KB]   2141 Also recommended admission for IV antibiotics, patient declines would like to pursue outpatient management 1st [KB]      ED Course User Index  [KB] Reynaldo Jackson Jr., DO                           Clinical Impression:  Final diagnoses:  [L03.114] Cellulitis of hand, left (Primary)  [M79.642] Pain of left hand          ED Disposition Condition    Discharge Stable          ED Prescriptions       Medication Sig Dispense Start Date End Date Auth. Provider    ciprofloxacin HCl (CIPRO) 500 MG tablet Take 1.5 tablets (750 mg total) by mouth every 12 (twelve) hours. for 10 days 30 tablet 4/12/2025 4/22/2025  Reynaldo Jackson Jr., DO    sulfamethoxazole-trimethoprim 800-160mg (BACTRIM DS) 800-160 mg Tab Take 1 tablet by mouth 2 (two) times daily. for 7 days 14 tablet 4/12/2025 4/19/2025 Reynaldo Jackson Jr., DO    HYDROcodone-acetaminophen (NORCO) 7.5-325 mg per tablet Take 1 tablet by mouth every 8 (eight) hours as needed for Pain. 12 tablet 4/12/2025 -- Reynaldo Jackson Jr., DO    ondansetron (ZOFRAN-ODT) 4 MG TbDL Take 1 tablet (4 mg total) by mouth every 8 (eight) hours as needed. 12 tablet 4/12/2025 -- Reynaldo Jackson Jr., DO          Follow-up Information    None          Reynaldo Jackson Jr., DO  04/12/25 2147         [1]   Social History  Tobacco Use    Smoking status: Every Day     Current packs/day: 1.00     Average packs/day: 1 pack/day for 12.0 years (12.0 ttl pk-yrs)     Types: Cigarettes    Smokeless tobacco: Never   Substance Use Topics    Alcohol use: Yes     Alcohol/week: 2.0 standard drinks of alcohol     Types: 2 Glasses of wine per week    Drug use: No        Reynaldo Jackson Jr., DO  04/12/25 2147

## 2025-04-13 NOTE — DISCHARGE INSTRUCTIONS
Take antibiotics as directed.  If symptoms continue to worsen, return for IV antibiotics.  It was recommended for IV antibiotics today.  Through shared decision-making with you , plan was to treat with oral antibiotics.

## 2025-04-13 NOTE — ED TRIAGE NOTES
Karen Gomez is here with abscess to left hand for 3 days and now more painful and swollen, increasing pain with ROM.

## 2025-04-13 NOTE — ED NOTES
Patient's medications were sent to the wrong pharmacy, patient has print out of all medications minus hydrocodone and will  that medication on Monday when her pharmacy opens. Dr. Bardales advised patient to take ibuprofen for discomfort. Patient advised to monitor swelling and come back to ER if condition worsens or pain is uncontrollable.

## 2025-04-16 ENCOUNTER — HOSPITAL ENCOUNTER (INPATIENT)
Facility: HOSPITAL | Age: 50
LOS: 1 days | Discharge: HOME | DRG: 854 | End: 2025-04-19
Attending: EMERGENCY MEDICINE | Admitting: INTERNAL MEDICINE
Payer: COMMERCIAL

## 2025-04-16 DIAGNOSIS — L02.91 ABSCESS: Primary | ICD-10-CM

## 2025-04-16 DIAGNOSIS — L03.114 CELLULITIS OF LEFT UPPER EXTREMITY: ICD-10-CM

## 2025-04-16 DIAGNOSIS — L02.512 ABSCESS OF LEFT HAND: ICD-10-CM

## 2025-04-16 DIAGNOSIS — R07.9 CHEST PAIN: ICD-10-CM

## 2025-04-16 PROBLEM — I10 PRIMARY HYPERTENSION: Status: ACTIVE | Noted: 2025-04-16

## 2025-04-16 PROBLEM — A41.9 SEPSIS: Status: ACTIVE | Noted: 2025-04-16

## 2025-04-16 PROBLEM — F17.200 NICOTINE DEPENDENCE: Status: ACTIVE | Noted: 2025-04-16

## 2025-04-16 LAB
ABSOLUTE EOSINOPHIL (SMH): 0.33 K/UL
ABSOLUTE MONOCYTE (SMH): 1.09 K/UL (ref 0.3–1)
ABSOLUTE NEUTROPHIL COUNT (SMH): 9.3 K/UL (ref 1.8–7.7)
ALBUMIN SERPL-MCNC: 3.7 G/DL (ref 3.5–5.2)
ALP SERPL-CCNC: 32 UNIT/L (ref 40–150)
ALT SERPL-CCNC: 17 UNIT/L (ref 10–44)
ANION GAP (SMH): 12 MMOL/L (ref 8–16)
AST SERPL-CCNC: 21 UNIT/L (ref 11–45)
BASOPHILS # BLD AUTO: 0.1 K/UL
BASOPHILS NFR BLD AUTO: 0.7 %
BILIRUB SERPL-MCNC: 0.3 MG/DL (ref 0.1–1)
BUN SERPL-MCNC: 13 MG/DL (ref 6–20)
CALCIUM SERPL-MCNC: 9.1 MG/DL (ref 8.7–10.5)
CHLORIDE SERPL-SCNC: 104 MMOL/L (ref 95–110)
CO2 SERPL-SCNC: 20 MMOL/L (ref 23–29)
CREAT SERPL-MCNC: 1 MG/DL (ref 0.5–1.4)
CRP SERPL-MCNC: 97.3 MG/L
ERYTHROCYTE [DISTWIDTH] IN BLOOD BY AUTOMATED COUNT: 14.6 % (ref 11.5–14.5)
GFR SERPLBLD CREATININE-BSD FMLA CKD-EPI: >60 ML/MIN/1.73/M2
GLUCOSE SERPL-MCNC: 119 MG/DL (ref 70–110)
HCT VFR BLD AUTO: 35.1 % (ref 37–48.5)
HCV AB SERPL QL IA: NORMAL
HGB BLD-MCNC: 11.2 GM/DL (ref 12–16)
HIV 1+2 AB+HIV1 P24 AG SERPL QL IA: NORMAL
HOLD SPECIMEN: NORMAL
HOLD SPECIMEN: NORMAL
IMM GRANULOCYTES # BLD AUTO: 0.06 K/UL (ref 0–0.04)
IMM GRANULOCYTES NFR BLD AUTO: 0.4 % (ref 0–0.5)
LACTATE SERPL-SCNC: 1.3 MMOL/L (ref 0.5–2.2)
LYMPHOCYTES # BLD AUTO: 2.91 K/UL (ref 1–4.8)
MAGNESIUM SERPL-MCNC: 2.1 MG/DL (ref 1.6–2.6)
MCH RBC QN AUTO: 26.2 PG (ref 27–31)
MCHC RBC AUTO-ENTMCNC: 31.9 G/DL (ref 32–36)
MCV RBC AUTO: 82 FL (ref 82–98)
NUCLEATED RBC (/100WBC) (SMH): 0 /100 WBC
PHOSPHATE SERPL-MCNC: 3.6 MG/DL (ref 2.7–4.5)
PLATELET # BLD AUTO: 528 K/UL (ref 150–450)
PMV BLD AUTO: 8.5 FL (ref 9.2–12.9)
POTASSIUM SERPL-SCNC: 3.9 MMOL/L (ref 3.5–5.1)
PROT SERPL-MCNC: 6.8 GM/DL (ref 6–8.4)
RBC # BLD AUTO: 4.28 M/UL (ref 4–5.4)
RELATIVE EOSINOPHIL (SMH): 2.4 % (ref 0–8)
RELATIVE LYMPHOCYTE (SMH): 21 % (ref 18–48)
RELATIVE MONOCYTE (SMH): 7.9 % (ref 4–15)
RELATIVE NEUTROPHIL (SMH): 67.6 % (ref 38–73)
SODIUM SERPL-SCNC: 136 MMOL/L (ref 136–145)
WBC # BLD AUTO: 13.83 K/UL (ref 3.9–12.7)

## 2025-04-16 PROCEDURE — 99285 EMERGENCY DEPT VISIT HI MDM: CPT | Mod: 25

## 2025-04-16 PROCEDURE — 87040 BLOOD CULTURE FOR BACTERIA: CPT | Mod: 91 | Performed by: EMERGENCY MEDICINE

## 2025-04-16 PROCEDURE — 25000003 PHARM REV CODE 250: Performed by: EMERGENCY MEDICINE

## 2025-04-16 PROCEDURE — 83605 ASSAY OF LACTIC ACID: CPT | Performed by: NURSE PRACTITIONER

## 2025-04-16 PROCEDURE — 84100 ASSAY OF PHOSPHORUS: CPT | Performed by: NURSE PRACTITIONER

## 2025-04-16 PROCEDURE — G0378 HOSPITAL OBSERVATION PER HR: HCPCS

## 2025-04-16 PROCEDURE — 36415 COLL VENOUS BLD VENIPUNCTURE: CPT | Performed by: NURSE PRACTITIONER

## 2025-04-16 PROCEDURE — 83735 ASSAY OF MAGNESIUM: CPT | Performed by: NURSE PRACTITIONER

## 2025-04-16 PROCEDURE — 63600175 PHARM REV CODE 636 W HCPCS: Performed by: NURSE PRACTITIONER

## 2025-04-16 PROCEDURE — 94760 N-INVAS EAR/PLS OXIMETRY 1: CPT

## 2025-04-16 PROCEDURE — 25000003 PHARM REV CODE 250: Performed by: INTERNAL MEDICINE

## 2025-04-16 PROCEDURE — 80053 COMPREHEN METABOLIC PANEL: CPT | Performed by: EMERGENCY MEDICINE

## 2025-04-16 PROCEDURE — 86803 HEPATITIS C AB TEST: CPT | Performed by: EMERGENCY MEDICINE

## 2025-04-16 PROCEDURE — 10060 I&D ABSCESS SIMPLE/SINGLE: CPT

## 2025-04-16 PROCEDURE — 96375 TX/PRO/DX INJ NEW DRUG ADDON: CPT

## 2025-04-16 PROCEDURE — 94799 UNLISTED PULMONARY SVC/PX: CPT

## 2025-04-16 PROCEDURE — 63600175 PHARM REV CODE 636 W HCPCS: Performed by: EMERGENCY MEDICINE

## 2025-04-16 PROCEDURE — 96367 TX/PROPH/DG ADDL SEQ IV INF: CPT

## 2025-04-16 PROCEDURE — 94761 N-INVAS EAR/PLS OXIMETRY MLT: CPT

## 2025-04-16 PROCEDURE — 25000003 PHARM REV CODE 250: Performed by: NURSE PRACTITIONER

## 2025-04-16 PROCEDURE — 63600175 PHARM REV CODE 636 W HCPCS: Performed by: INTERNAL MEDICINE

## 2025-04-16 PROCEDURE — 36415 COLL VENOUS BLD VENIPUNCTURE: CPT | Performed by: INTERNAL MEDICINE

## 2025-04-16 PROCEDURE — 96365 THER/PROPH/DIAG IV INF INIT: CPT

## 2025-04-16 PROCEDURE — 87186 SC STD MICRODIL/AGAR DIL: CPT | Performed by: EMERGENCY MEDICINE

## 2025-04-16 PROCEDURE — 96366 THER/PROPH/DIAG IV INF ADDON: CPT

## 2025-04-16 PROCEDURE — 85025 COMPLETE CBC W/AUTO DIFF WBC: CPT | Performed by: EMERGENCY MEDICINE

## 2025-04-16 PROCEDURE — 86140 C-REACTIVE PROTEIN: CPT | Performed by: NURSE PRACTITIONER

## 2025-04-16 PROCEDURE — 87389 HIV-1 AG W/HIV-1&-2 AB AG IA: CPT | Performed by: EMERGENCY MEDICINE

## 2025-04-16 PROCEDURE — 99900035 HC TECH TIME PER 15 MIN (STAT)

## 2025-04-16 PROCEDURE — 0J9K0ZZ DRAINAGE OF LEFT HAND SUBCUTANEOUS TISSUE AND FASCIA, OPEN APPROACH: ICD-10-PCS | Performed by: EMERGENCY MEDICINE

## 2025-04-16 RX ORDER — ONDANSETRON HYDROCHLORIDE 2 MG/ML
4 INJECTION, SOLUTION INTRAVENOUS EVERY 8 HOURS PRN
Status: DISCONTINUED | OUTPATIENT
Start: 2025-04-16 | End: 2025-04-19 | Stop reason: HOSPADM

## 2025-04-16 RX ORDER — HYDROCODONE BITARTRATE AND ACETAMINOPHEN 10; 325 MG/1; MG/1
1 TABLET ORAL EVERY 6 HOURS PRN
Refills: 0 | Status: DISCONTINUED | OUTPATIENT
Start: 2025-04-16 | End: 2025-04-19 | Stop reason: HOSPADM

## 2025-04-16 RX ORDER — AMOXICILLIN 250 MG
1 CAPSULE ORAL 2 TIMES DAILY
Status: DISCONTINUED | OUTPATIENT
Start: 2025-04-16 | End: 2025-04-19 | Stop reason: HOSPADM

## 2025-04-16 RX ORDER — IPRATROPIUM BROMIDE AND ALBUTEROL SULFATE 2.5; .5 MG/3ML; MG/3ML
3 SOLUTION RESPIRATORY (INHALATION) EVERY 4 HOURS PRN
Status: DISCONTINUED | OUTPATIENT
Start: 2025-04-16 | End: 2025-04-19 | Stop reason: HOSPADM

## 2025-04-16 RX ORDER — SIMETHICONE 80 MG
1 TABLET,CHEWABLE ORAL 4 TIMES DAILY PRN
Status: DISCONTINUED | OUTPATIENT
Start: 2025-04-16 | End: 2025-04-19 | Stop reason: HOSPADM

## 2025-04-16 RX ORDER — NALOXONE HCL 0.4 MG/ML
0.02 VIAL (ML) INJECTION
Status: DISCONTINUED | OUTPATIENT
Start: 2025-04-16 | End: 2025-04-19 | Stop reason: HOSPADM

## 2025-04-16 RX ORDER — TALC
6 POWDER (GRAM) TOPICAL NIGHTLY PRN
Status: DISCONTINUED | OUTPATIENT
Start: 2025-04-16 | End: 2025-04-19 | Stop reason: HOSPADM

## 2025-04-16 RX ORDER — LANOLIN ALCOHOL/MO/W.PET/CERES
800 CREAM (GRAM) TOPICAL
Status: DISCONTINUED | OUTPATIENT
Start: 2025-04-16 | End: 2025-04-19 | Stop reason: HOSPADM

## 2025-04-16 RX ORDER — LIDOCAINE HYDROCHLORIDE AND EPINEPHRINE 10; 10 UG/ML; MG/ML
10 INJECTION, SOLUTION INFILTRATION; PERINEURAL ONCE
Status: COMPLETED | OUTPATIENT
Start: 2025-04-16 | End: 2025-04-16

## 2025-04-16 RX ORDER — GLUCAGON 1 MG
1 KIT INJECTION
Status: DISCONTINUED | OUTPATIENT
Start: 2025-04-16 | End: 2025-04-19

## 2025-04-16 RX ORDER — SODIUM CHLORIDE 9 MG/ML
INJECTION, SOLUTION INTRAVENOUS CONTINUOUS
Status: DISCONTINUED | OUTPATIENT
Start: 2025-04-16 | End: 2025-04-16

## 2025-04-16 RX ORDER — HYDRALAZINE HYDROCHLORIDE 25 MG/1
25 TABLET, FILM COATED ORAL EVERY 8 HOURS PRN
Status: DISCONTINUED | OUTPATIENT
Start: 2025-04-16 | End: 2025-04-19 | Stop reason: HOSPADM

## 2025-04-16 RX ORDER — CEFTRIAXONE 2 G/1
2 INJECTION, POWDER, FOR SOLUTION INTRAMUSCULAR; INTRAVENOUS
Status: DISCONTINUED | OUTPATIENT
Start: 2025-04-16 | End: 2025-04-18

## 2025-04-16 RX ORDER — SODIUM CHLORIDE 0.9 % (FLUSH) 0.9 %
10 SYRINGE (ML) INJECTION EVERY 12 HOURS PRN
Status: DISCONTINUED | OUTPATIENT
Start: 2025-04-16 | End: 2025-04-19

## 2025-04-16 RX ORDER — MORPHINE SULFATE 2 MG/ML
2 INJECTION, SOLUTION INTRAMUSCULAR; INTRAVENOUS ONCE
Status: COMPLETED | OUTPATIENT
Start: 2025-04-16 | End: 2025-04-16

## 2025-04-16 RX ORDER — SODIUM,POTASSIUM PHOSPHATES 280-250MG
2 POWDER IN PACKET (EA) ORAL
Status: DISCONTINUED | OUTPATIENT
Start: 2025-04-16 | End: 2025-04-19 | Stop reason: HOSPADM

## 2025-04-16 RX ORDER — ALUMINUM HYDROXIDE, MAGNESIUM HYDROXIDE, AND SIMETHICONE 1200; 120; 1200 MG/30ML; MG/30ML; MG/30ML
30 SUSPENSION ORAL 4 TIMES DAILY PRN
Status: DISCONTINUED | OUTPATIENT
Start: 2025-04-16 | End: 2025-04-19 | Stop reason: HOSPADM

## 2025-04-16 RX ORDER — IBUPROFEN 200 MG
24 TABLET ORAL
Status: DISCONTINUED | OUTPATIENT
Start: 2025-04-16 | End: 2025-04-19

## 2025-04-16 RX ORDER — PROCHLORPERAZINE EDISYLATE 5 MG/ML
5 INJECTION INTRAMUSCULAR; INTRAVENOUS EVERY 6 HOURS PRN
Status: DISCONTINUED | OUTPATIENT
Start: 2025-04-16 | End: 2025-04-19 | Stop reason: HOSPADM

## 2025-04-16 RX ORDER — AMLODIPINE BESYLATE 5 MG/1
10 TABLET ORAL DAILY
Status: DISCONTINUED | OUTPATIENT
Start: 2025-04-16 | End: 2025-04-19 | Stop reason: HOSPADM

## 2025-04-16 RX ORDER — ACETAMINOPHEN 325 MG/1
650 TABLET ORAL EVERY 8 HOURS PRN
Status: DISCONTINUED | OUTPATIENT
Start: 2025-04-16 | End: 2025-04-19 | Stop reason: HOSPADM

## 2025-04-16 RX ORDER — HYDROCODONE BITARTRATE AND ACETAMINOPHEN 5; 325 MG/1; MG/1
1 TABLET ORAL EVERY 6 HOURS PRN
Refills: 0 | Status: DISCONTINUED | OUTPATIENT
Start: 2025-04-16 | End: 2025-04-19 | Stop reason: HOSPADM

## 2025-04-16 RX ORDER — IBUPROFEN 200 MG
16 TABLET ORAL
Status: DISCONTINUED | OUTPATIENT
Start: 2025-04-16 | End: 2025-04-19

## 2025-04-16 RX ORDER — HYDROCODONE BITARTRATE AND ACETAMINOPHEN 5; 325 MG/1; MG/1
1 TABLET ORAL
Refills: 0 | Status: COMPLETED | OUTPATIENT
Start: 2025-04-16 | End: 2025-04-16

## 2025-04-16 RX ADMIN — ACETAMINOPHEN 650 MG: 325 TABLET ORAL at 04:04

## 2025-04-16 RX ADMIN — HYDROCODONE BITARTRATE AND ACETAMINOPHEN 1 TABLET: 10; 325 TABLET ORAL at 08:04

## 2025-04-16 RX ADMIN — SENNOSIDES AND DOCUSATE SODIUM 1 TABLET: 50; 8.6 TABLET ORAL at 10:04

## 2025-04-16 RX ADMIN — LIDOCAINE HYDROCHLORIDE AND EPINEPHRINE 10 ML: 10; 10 INJECTION, SOLUTION INFILTRATION; PERINEURAL at 06:04

## 2025-04-16 RX ADMIN — AMLODIPINE BESYLATE 10 MG: 5 TABLET ORAL at 10:04

## 2025-04-16 RX ADMIN — PIPERACILLIN SODIUM AND TAZOBACTAM SODIUM 3.38 G: 3; .375 INJECTION, POWDER, LYOPHILIZED, FOR SOLUTION INTRAVENOUS at 05:04

## 2025-04-16 RX ADMIN — VANCOMYCIN HYDROCHLORIDE 1500 MG: 1.5 INJECTION, POWDER, LYOPHILIZED, FOR SOLUTION INTRAVENOUS at 06:04

## 2025-04-16 RX ADMIN — CEFTRIAXONE SODIUM 2 G: 2 INJECTION, POWDER, FOR SOLUTION INTRAMUSCULAR; INTRAVENOUS at 12:04

## 2025-04-16 RX ADMIN — HYDROCODONE BITARTRATE AND ACETAMINOPHEN 1 TABLET: 5; 325 TABLET ORAL at 12:04

## 2025-04-16 RX ADMIN — MELATONIN TAB 3 MG 6 MG: 3 TAB at 08:04

## 2025-04-16 RX ADMIN — CIPROFOLXACIN 750 MG: 250 TABLET ORAL at 08:04

## 2025-04-16 RX ADMIN — HYDROCODONE BITARTRATE AND ACETAMINOPHEN 1 TABLET: 5; 325 TABLET ORAL at 05:04

## 2025-04-16 RX ADMIN — ONDANSETRON 4 MG: 2 INJECTION INTRAMUSCULAR; INTRAVENOUS at 04:04

## 2025-04-16 RX ADMIN — HYDRALAZINE HYDROCHLORIDE 25 MG: 25 TABLET ORAL at 06:04

## 2025-04-16 RX ADMIN — ACETAMINOPHEN 650 MG: 325 TABLET ORAL at 10:04

## 2025-04-16 RX ADMIN — SENNOSIDES AND DOCUSATE SODIUM 1 TABLET: 50; 8.6 TABLET ORAL at 08:04

## 2025-04-16 RX ADMIN — SODIUM CHLORIDE 1000 MG: 9 INJECTION, SOLUTION INTRAVENOUS at 06:04

## 2025-04-16 RX ADMIN — CIPROFOLXACIN 750 MG: 250 TABLET ORAL at 10:04

## 2025-04-16 RX ADMIN — MORPHINE SULFATE 2 MG: 2 INJECTION, SOLUTION INTRAMUSCULAR; INTRAVENOUS at 06:04

## 2025-04-16 NOTE — PLAN OF CARE
[facility] - Med/Surg  Initial Discharge Assessment       Primary Care Provider: No, Primary Doctor    Admission Diagnosis: Abscess [L02.91]    Admission Date: 4/16/2025  Expected Discharge Date: 4/18/2025    Transition of Care Barriers: None    [facility]: [facility] / Plan: [employer] / Product Type: Commercial /     Extended Emergency Contact Information  Primary Emergency Contact: Ailyn Russell           [place] [address] [facility]  Home Phone: [phone]  Relation: Mother  Secondary Emergency Contact: Obi Barber  Mobile Phone: [phone]  Relation: Son  Preferred language: English   needed? No    Discharge Plan A: Home with family  Discharge Plan B: Home      [id number] - [place] - [address] & [facility]  [address]   [place] [address]  Phone: [phone] Fax: [phone]    Discharge assessment completed at bedside with pt and daughter. Verified and corrected facesheet information.  Reports lives at listed address with family.  Denies HH, HD, blood thinners, opt services, recent hospital stay and DME.  Discharge plan is home.  Family to provide transportation home at discharge.  CM to follow for needs.    Initial Assessment (most recent)       Adult Discharge Assessment - 04/16/25 1601          Discharge Assessment    Assessment Type Discharge Planning Assessment     Confirmed/corrected address, phone number and insurance Yes     Confirmed Demographics Correct on Facesheet     Source of Information patient     Does patient/caregiver understand observation status Yes     People in Home other relative(s)     Facility Arrived From: home     Do you expect to return to your current living situation? Yes     Do you have help at home or someone to help you manage your care at home? Yes     Who are your caregiver(s) and their phone number(s)? family     Prior to hospitilization  cognitive status: Alert/Oriented     Current cognitive status: Alert/Oriented     Walking or Climbing Stairs Difficulty no     Dressing/Bathing Difficulty no     Equipment Currently Used at Home none     Readmission within 30 days? No     Patient currently being followed by outpatient case management? No     Do you currently have service(s) that help you manage your care at home? No     Do you take prescription medications? Yes     Do you have prescription coverage? Yes     Do you have any problems affording any of your prescribed medications? No     Is the patient taking medications as prescribed? yes     Who is going to help you get home at discharge? daughter     How do you get to doctors appointments? car, drives self     Are you on dialysis? No     Do you take coumadin? No     Discharge Plan A Home with family     Discharge Plan B Home     DME Needed Upon Discharge  none     Discharge Plan discussed with: Patient     Transition of Care Barriers None

## 2025-04-16 NOTE — PLAN OF CARE
POC reviewed VSS afebrile pain managed with PRN medications nausea managed with PRN medications, Admitted with lefty hand abscess I&D performed in ER cuklture obtained and sent to lab Zosyn and Vancomycin ordered and given

## 2025-04-16 NOTE — ASSESSMENT & PLAN NOTE
Dangers of vaping with nicotine were reviewed with patient in detail. Patient was Counseled for 3-10 minutes. Nicotine replacement options were discussed. Nicotine replacement was discussed- not prescribed per patient's request

## 2025-04-16 NOTE — NURSING
Arrives to room 416 admit from ER for cellulitis / abscess left hand AAOX4 VSS afebrile left hand wrapped Photos taken down loaded in EPIC, Skin WD to touch no impaired skin integrity noted at this time

## 2025-04-16 NOTE — PROGRESS NOTES
"Pharmacokinetic Initial Assessment: IV Vancomycin    Assessment/Plan:    Initiate intravenous vancomycin with dose of 1500 mg once followed by a maintenance dose of vancomycin 1000mg IV every 12 hours  Desired empiric serum trough concentration is 10 to 15 mcg/mL  Draw vancomycin trough level 60 min prior to third dose on 04/17/25 at approximately 0530  Pharmacy will continue to follow and monitor vancomycin.      Please contact pharmacy at extension [phone] with any questions regarding this assessment.     Thank you for the consult,   Wyatt Harvey       Patient brief summary:  Karen Gomez is a 50 y.o. female initiated on antimicrobial therapy with IV Vancomycin for treatment of suspected skin & soft tissue infection    Drug Allergies:   Review of patient's allergies indicates:  No Known Allergies    Actual Body Weight:   74.8kg    Renal Function:   Estimated Creatinine Clearance: 64.5 mL/min (based on SCr of 1 mg/dL).,     CBC (last 72 hours):  Recent Labs   Lab Result Units 04/16/25  0540   WBC K/uL 13.83*   Hgb gm/dL 11.2*   Hct % 35.1*   Platelet Count K/uL 528*   Mono % % 7.9   Eos % % 2.4   Basophil % % 0.7       Metabolic Panel (last 72 hours):  Recent Labs   Lab Result Units 04/16/25  0540   Sodium mmol/L 136   Potassium mmol/L 3.9   Chloride mmol/L 104   CO2 mmol/L 20*   Glucose mg/dL 119*   BUN mg/dL 13   Creatinine mg/dL 1.0   Albumin g/dL 3.7   Bilirubin Total mg/dL 0.3   ALP unit/L 32*   AST unit/L 21   ALT unit/L 17   Magnesium mg/dL 2.1   Phosphorus Level mg/dL 3.6       Drug levels (last 3 results):  No results for input(s): "VANCOMYCINRA", "VANCORANDOM", "VANCOMYCINPE", "VANCOPEAK", "VANCOMYCINTR", "VANCOTROUGH" in the last 72 hours.    Microbiologic Results:  Microbiology Results (last 7 days)       Procedure Component Value Units Date/Time    Aerobic culture (Specify Source) **CANNOT BE ORDERED AS STAT** [[id number]] Collected: 04/16/25 0599    Order Status: Sent Specimen: Abscess from Hand, Left " Updated: 04/16/25 0557    Blood Culture #2 [[id number]] Collected: 04/16/25 0540    Order Status: Sent Specimen: Blood from Peripheral, Hand, Left Updated: 04/16/25 0542    Blood Culture #1 [[id number]] Collected: 04/16/25 0540    Order Status: Sent Specimen: Blood from Peripheral, Forearm, Left Updated: 04/16/25 0541

## 2025-04-16 NOTE — ED PROVIDER NOTES
Encounter Date: 4/16/2025       History     Chief Complaint   Patient presents with    Wound Check     Left hand wound, currently on antibiotics     50-year-old female presenting with worsening left hand swelling and pain.  She was seen here four days ago for the same.  She declined admission for IV antibiotics at that time.  She was started on Cipro and Bactrim for a hand cellulitis.  Since that time she has developed more localized swelling and discoloration and fluctuance over her dorsal 5th MCP.  She has also developed increased swelling and pain upper arm to her elbow in his generalized fatigue and nausea.  No known fever.    The history is provided by the patient.     Review of patient's allergies indicates:  No Known Allergies  History reviewed. No pertinent past medical history.  Past Surgical History:   Procedure Laterality Date    FINGER SURGERY      left hand middle     No family history on file.  Social History[1]  Review of Systems   Musculoskeletal:  Positive for myalgias.   Skin:  Positive for color change and wound.   All other systems reviewed and are negative.      Physical Exam     Initial Vitals [04/16/25 0451]   BP Pulse Resp Temp SpO2   (!) 188/88 100 18 98.3 °F (36.8 °C) 99 %      MAP       --         Physical Exam    Nursing note and vitals reviewed.  Constitutional: She appears well-developed and well-nourished. She is not diaphoretic. No distress.   HENT:   Head: Normocephalic.   Eyes: Conjunctivae are normal.   Neck: Neck supple.   Normal range of motion.  Cardiovascular:  Normal rate.           Pulmonary/Chest: No respiratory distress.   Abdominal: She exhibits no distension.   Musculoskeletal:         General: Edema present.      Cervical back: Normal range of motion and neck supple.     Neurological: She is alert. She has normal strength.   Skin: Skin is warm and dry. There is erythema.   Fluctuance and tenderness overlying the left 5th MCP.  There is generalized swelling of the dorsal  hand and forearm with erythema.   Psychiatric: She has a normal mood and affect.         ED Course   I & D - Incision and Drainage    Date/Time: 4/16/2025 4:42 AM  Location procedure was performed: [facility] EMERGENCY DEPARTMENT    Performed by: Marv Bardales MD  Authorized by: Marv Bardales MD  Type: abscess  Body area: upper extremity  Location details: left hand  Anesthesia: local infiltration    Anesthesia:  Local Anesthetic: lidocaine 1% with epinephrine  Scalpel size: 11  Incision type: single straight  Incision depth: subcutaneous  Complexity: simple  Drainage: pus  Drainage amount: moderate  Wound treatment: incision, drainage and expression of material  Estimated blood loss (mL): 4  Patient tolerance: Patient tolerated the procedure well with no immediate complications    Incision depth: subcutaneous        Labs Reviewed   CULTURE, BLOOD   CULTURE, BLOOD   CULTURE, AEROBIC  (SPECIFY SOURCE)   HEPATITIS C ANTIBODY   HIV 1 / 2 ANTIBODY   CBC W/ AUTO DIFFERENTIAL    Narrative:     The following orders were created for panel order CBC auto differential.  Procedure                               Abnormality         Status                     ---------                               -----------         ------                     CBC with Differential[[id number]]                                                         Please view results for these tests on the individual orders.   COMPREHENSIVE METABOLIC PANEL   CBC WITH DIFFERENTIAL   LACTIC ACID, PLASMA   C-REACTIVE PROTEIN          Imaging Results    None          Medications   vancomycin - pharmacy to dose (has no administration in time range)   piperacillin-tazobactam (ZOSYN) 3.375 g in D5W 100 mL IVPB (MB+) (has no administration in time range)   vancomycin 1,500 mg in 0.9% NaCl 250 mL IVPB (admixture device) (has no administration in time range)   vancomycin - pharmacy to dose (has no administration in time range)   LIDOcaine-EPINEPHrine 1%-1:100,000  injection 10 mL (10 mLs Intradermal Given by Provider 4/16/25 0615)   HYDROcodone-acetaminophen 5-325 mg per tablet 1 tablet (1 tablet Oral Given 4/16/25 5589)     Medical Decision Making  Patient does appear to have a dorsal hand abscess.  I and D performed in the ED with moderate purulent drainage.  The abscess seems superficial and I doubt deeper abscess.  Wound culture was sent.  She is now agreeable to admission for IV antibiotics.  I have ordered IV Zosyn and vancomycin.  Hospitalist has been consulted for admission.    Amount and/or Complexity of Data Reviewed  Labs: ordered.    Risk  Prescription drug management.                                      Clinical Impression:  Final diagnoses:  [L02.91] Abscess (Primary)  [L03.114] Cellulitis of left upper extremity          ED Disposition Condition    Observation                   [1]   Social History  Tobacco Use    Smoking status: Every Day     Current packs/day: 1.00     Average packs/day: 1 pack/day for 12.0 years (12.0 ttl pk-yrs)     Types: Cigarettes    Smokeless tobacco: Never   Substance Use Topics    Alcohol use: Yes     Alcohol/week: 2.0 standard drinks of alcohol     Types: 2 Glasses of wine per week    Drug use: No        Marv Bardales MD  04/16/25 4297

## 2025-04-16 NOTE — ASSESSMENT & PLAN NOTE
Patient's blood pressure range in the last 24 hours was: BP  Min: 187/87  Max: 190/91.The patient's inpatient anti-hypertensive regimen is listed below:  Current Antihypertensives  hydrALAZINE tablet 25 mg, Every 8 hours PRN, Oral-pt does not take antihypertensives at home    Plan  - BP is uncontrolled, will adjust as follows: prn hydralazine  - needs OP f/u with PCP

## 2025-04-16 NOTE — H&P
"[facility] Medicine  History & Physical    Patient Name: Karen Gomez  MRN: [id number]  Patient Class: OP- Observation  Admission Date: 4/16/2025  Attending Physician: Francis Garcia MD   Primary Care Provider: Negra Primary Doctor         Patient information was obtained from patient, past medical records, and ER records.     Subjective:     Principal Problem:Abscess of left hand    Chief Complaint:   Chief Complaint   Patient presents with    Wound Check     Left hand wound, currently on antibiotics        HPI: Karen Gomez is a 49 y/o F with a past medical history of HTN, not on chronic antihypertensives, who presented to the ED with abscess to the left hand.  She states that she had "a spider bite" to that location that she noticed about a week ago.  At that time she was in [place] and there was salt water exposure to the wound.  She reports that night she noticed redness and some swelling to the left hand.  3 days later she was seen in the ED for this problem.  Hospital admission was recommended at that time; however, patient declined.  She was placed on oral bactrim and oral cipro with which she reports compliance; however, erythema and swelling have continued and an obvious abscess has formed.  I and D performed in the ED with moderate purulent drainage, cultures obtained.  Hospital medicine to admit for continued medical management.       Past Medical History:   Diagnosis Date    Hypertension        Past Surgical History:   Procedure Laterality Date    FINGER SURGERY      left hand middle       Review of patient's allergies indicates:  No Known Allergies    No current facility-administered medications on file prior to encounter.     Current Outpatient Medications on File Prior to Encounter   Medication Sig    ciprofloxacin HCl (CIPRO) 500 MG tablet Take 1.5 tablets (750 mg total) by mouth every 12 (twelve) hours. for 10 days    HYDROcodone-acetaminophen (NORCO) 7.5-325 mg " per tablet Take 1 tablet by mouth every 8 (eight) hours as needed for Pain.    ibuprofen (ADVIL,MOTRIN) 600 MG tablet Take 1 tablet (600 mg total) by mouth every 6 (six) hours as needed for Pain.    mupirocin (BACTROBAN) 2 % ointment Apply topically 3 (three) times daily.    ondansetron (ZOFRAN-ODT) 4 MG TbDL Take 1 tablet (4 mg total) by mouth every 8 (eight) hours as needed.    sulfamethoxazole-trimethoprim 800-160mg (BACTRIM DS) 800-160 mg Tab Take 1 tablet by mouth 2 (two) times daily. for 7 days     Family History    None       Tobacco Use    Smoking status: Every Day     Current packs/day: 1.00     Average packs/day: 1 pack/day for 12.0 years (12.0 ttl pk-yrs)     Types: Cigarettes, Vaping with nicotine    Smokeless tobacco: Never   Substance and Sexual Activity    Alcohol use: Yes     Alcohol/week: 2.0 standard drinks of alcohol     Types: 2 Glasses of wine per week    Drug use: No    Sexual activity: Yes     Review of Systems   Constitutional:  Negative for chills and fever.   Gastrointestinal:  Positive for nausea. Negative for diarrhea and vomiting.   Musculoskeletal:  Positive for arthralgias and joint swelling.   Skin:  Positive for color change and wound.     Objective:     Vital Signs (Most Recent):  Temp: 98.3 °F (36.8 °C) (04/16/25 0451)  Pulse: 90 (04/16/25 0601)  Resp: 17 (04/16/25 0616)  BP: (!) 187/87 (04/16/25 0601)  SpO2: 98 % (04/16/25 0601) Vital Signs (24h Range):  Temp:  [98.3 °F (36.8 °C)] 98.3 °F (36.8 °C)  Pulse:  [] 90  Resp:  [17-18] 17  SpO2:  [98 %-100 %] 98 %  BP: (187-190)/(87-91) 187/87     Weight: 74.8 kg (165 lb)  Body mass index is 30.18 kg/m².     Physical Exam  Constitutional:       General: She is not in acute distress.     Appearance: Normal appearance. She is well-developed.   HENT:      Head: Normocephalic and atraumatic.   Eyes:      Conjunctiva/sclera: Conjunctivae normal.      Pupils: Pupils are equal, round, and reactive to light.   Cardiovascular:      Rate and  Rhythm: Normal rate and regular rhythm.      Heart sounds: Normal heart sounds.   Pulmonary:      Effort: Pulmonary effort is normal. No respiratory distress.      Breath sounds: Normal breath sounds.   Abdominal:      General: Bowel sounds are normal. There is no distension.      Palpations: Abdomen is soft.      Tenderness: There is no abdominal tenderness.   Musculoskeletal:         General: Swelling (left hand extending up the arm to left elbow) and tenderness present. Normal range of motion.      Cervical back: Normal range of motion and neck supple.   Lymphadenopathy:      Upper Body:      Left upper body: No axillary adenopathy.   Skin:     General: Skin is warm and dry.      Findings: Erythema present.      Comments: See photo   Neurological:      General: No focal deficit present.      Mental Status: She is alert and oriented to person, place, and time.   Psychiatric:         Mood and Affect: Mood normal.         Behavior: Behavior normal.         Thought Content: Thought content normal.              CRANIAL NERVES     CN III, IV, VI   Pupils are equal, round, and reactive to light.       Significant Labs: All pertinent labs within the past 24 hours have been reviewed.  CBC:   Recent Labs   Lab 04/16/25  0540   WBC 13.83*   HGB 11.2*   HCT 35.1*   *     CMP:   Recent Labs   Lab 04/16/25  0540      K 3.9   CO2 20*   BUN 13   CREATININE 1.0   CALCIUM 9.1   ALBUMIN 3.7   BILITOT 0.3   ALKPHOS 32*   AST 21   ALT 17       Magnesium:   Recent Labs   Lab 04/16/25  0540   MG 2.1       Significant Imaging: I have reviewed all pertinent imaging results/findings within the past 24 hours.  Assessment/Plan:     Assessment & Plan  Abscess of left hand  With exposure to salt water  IV ceftriaxone, oral cipro, IV vanc  MRI left hand  I&D performed in the ED.  May need further washout.  Consult wound care  Pain control    Primary hypertension  Patient's blood pressure range in the last 24 hours was: BP  Min:  "187/87  Max: 190/91.The patient's inpatient anti-hypertensive regimen is listed below:  Current Antihypertensives  hydrALAZINE tablet 25 mg, Every 8 hours PRN, Oral-pt does not take antihypertensives at home    Plan  - BP is uncontrolled, will adjust as follows: prn hydralazine  - needs OP f/u with PCP  Nicotine dependence  Dangers of vaping with nicotine were reviewed with patient in detail. Patient was Counseled for 3-10 minutes. Nicotine replacement options were discussed. Nicotine replacement was discussed- not prescribed per patient's request  Sepsis  This patient does have evidence of infective focus  My overall impression is sepsis.  Source: Skin and Soft Tissue (location left hand)  Antibiotics given-   Antibiotics (72h ago, onward)      Start     Stop Route Frequency Ordered    04/16/25 1830  vancomycin (VANCOCIN) 1,000 mg in 0.9% NaCl 250 mL IVPB (admixture device)         -- IV Every 12 hours (non-standard times) 04/16/25 0641    04/16/25 1200  cefTRIAXone injection 2 g         -- IV Every 24 hours (non-standard times) 04/16/25 0621    04/16/25 0900  ciprofloxacin HCl tablet 750 mg         -- Oral Every 12 hours 04/16/25 0639    04/16/25 0638  vancomycin - pharmacy to dose  (vancomycin IVPB (PEDS and ADULTS))        Placed in "And" Linked Group    -- IV pharmacy to manage frequency 04/16/25 0538    04/16/25 0530  vancomycin 1,500 mg in 0.9% NaCl 250 mL IVPB (admixture device)         -- IV Once 04/16/25 0529          Latest lactate reviewed-  No results for input(s): "LACTATE", "POCLAC" in the last 72 hours.  Organ dysfunction indicated by  tachycardia    Fluid challenge Fluid Not Needed - Patient is not hypotensive and/or lactate is less than 4.0.     Post- resuscitation assessment No - Post resuscitation assessment not needed       Will Not start Pressors- Levophed for MAP of 65  Source control achieved by: I&D, IV abx  VTE Risk Mitigation (From admission, onward)           Ordered     IP VTE HIGH RISK " PATIENT  Once         04/16/25 0543     Place sequential compression device  Until discontinued         04/16/25 0543     Reason for No Pharmacological VTE Prophylaxis  Once        Question:  Reasons:  Answer:  Patient is Ambulatory    04/16/25 0543                         On 04/16/2025, patient should be placed in hospital observation services under my care in collaboration with Dr. Garcia.    Ivanna Osborn, FNP  Department of Hospital Medicine  formerly [facility]

## 2025-04-16 NOTE — HPI
"Karen Gomez is a 49 y/o F with a past medical history of HTN, not on chronic antihypertensives, who presented to the ED with abscess to the left hand.  She states that she had "a spider bite" to that location that she noticed about a week ago.  At that time she was in [place] and there was salt water exposure to the wound.  She reports that night she noticed redness and some swelling to the left hand.  3 days later she was seen in the ED for this problem.  Hospital admission was recommended at that time; however, patient declined.  She was placed on oral bactrim and oral cipro with which she reports compliance; however, erythema and swelling have continued and an obvious abscess has formed.  I and D performed in the ED with moderate purulent drainage, cultures obtained.  Hospital medicine to admit for continued medical management.     "

## 2025-04-16 NOTE — ASSESSMENT & PLAN NOTE
With exposure to salt water  IV ceftriaxone, oral cipro, IV vanc  MRI left hand  I&D performed in the ED.  May need further washout.  Consult wound care  Pain control

## 2025-04-16 NOTE — ASSESSMENT & PLAN NOTE
"This patient does have evidence of infective focus  My overall impression is sepsis.  Source: Skin and Soft Tissue (location left hand)  Antibiotics given-   Antibiotics (72h ago, onward)      Start     Stop Route Frequency Ordered    04/16/25 1830  vancomycin (VANCOCIN) 1,000 mg in 0.9% NaCl 250 mL IVPB (admixture device)         -- IV Every 12 hours (non-standard times) 04/16/25 0641    04/16/25 1200  cefTRIAXone injection 2 g         -- IV Every 24 hours (non-standard times) 04/16/25 0621    04/16/25 0900  ciprofloxacin HCl tablet 750 mg         -- Oral Every 12 hours 04/16/25 0639    04/16/25 0638  vancomycin - pharmacy to dose  (vancomycin IVPB (PEDS and ADULTS))        Placed in "And" Linked Group    -- IV pharmacy to manage frequency 04/16/25 0538    04/16/25 0530  vancomycin 1,500 mg in 0.9% NaCl 250 mL IVPB (admixture device)         -- IV Once 04/16/25 0529          Latest lactate reviewed-  No results for input(s): "LACTATE", "POCLAC" in the last 72 hours.  Organ dysfunction indicated by tachycardia    Fluid challenge Fluid Not Needed - Patient is not hypotensive and/or lactate is less than 4.0.     Post- resuscitation assessment No - Post resuscitation assessment not needed       Will Not start Pressors- Levophed for MAP of 65  Source control achieved by: I&D, IV abx  "

## 2025-04-16 NOTE — SUBJECTIVE & OBJECTIVE
Past Medical History:   Diagnosis Date    Hypertension        Past Surgical History:   Procedure Laterality Date    FINGER SURGERY      left hand middle       Review of patient's allergies indicates:  No Known Allergies    No current facility-administered medications on file prior to encounter.     Current Outpatient Medications on File Prior to Encounter   Medication Sig    ciprofloxacin HCl (CIPRO) 500 MG tablet Take 1.5 tablets (750 mg total) by mouth every 12 (twelve) hours. for 10 days    HYDROcodone-acetaminophen (NORCO) 7.5-325 mg per tablet Take 1 tablet by mouth every 8 (eight) hours as needed for Pain.    ibuprofen (ADVIL,MOTRIN) 600 MG tablet Take 1 tablet (600 mg total) by mouth every 6 (six) hours as needed for Pain.    mupirocin (BACTROBAN) 2 % ointment Apply topically 3 (three) times daily.    ondansetron (ZOFRAN-ODT) 4 MG TbDL Take 1 tablet (4 mg total) by mouth every 8 (eight) hours as needed.    sulfamethoxazole-trimethoprim 800-160mg (BACTRIM DS) 800-160 mg Tab Take 1 tablet by mouth 2 (two) times daily. for 7 days     Family History    None       Tobacco Use    Smoking status: Every Day     Current packs/day: 1.00     Average packs/day: 1 pack/day for 12.0 years (12.0 ttl pk-yrs)     Types: Cigarettes, Vaping with nicotine    Smokeless tobacco: Never   Substance and Sexual Activity    Alcohol use: Yes     Alcohol/week: 2.0 standard drinks of alcohol     Types: 2 Glasses of wine per week    Drug use: No    Sexual activity: Yes     Review of Systems   Constitutional:  Negative for chills and fever.   Gastrointestinal:  Positive for nausea. Negative for diarrhea and vomiting.   Musculoskeletal:  Positive for arthralgias and joint swelling.   Skin:  Positive for color change and wound.     Objective:     Vital Signs (Most Recent):  Temp: 98.3 °F (36.8 °C) (04/16/25 0451)  Pulse: 90 (04/16/25 0601)  Resp: 17 (04/16/25 0616)  BP: (!) 187/87 (04/16/25 0601)  SpO2: 98 % (04/16/25 0601) Vital Signs (24h  Range):  Temp:  [98.3 °F (36.8 °C)] 98.3 °F (36.8 °C)  Pulse:  [] 90  Resp:  [17-18] 17  SpO2:  [98 %-100 %] 98 %  BP: (187-190)/(87-91) 187/87     Weight: 74.8 kg (165 lb)  Body mass index is 30.18 kg/m².     Physical Exam  Constitutional:       General: She is not in acute distress.     Appearance: Normal appearance. She is well-developed.   HENT:      Head: Normocephalic and atraumatic.   Eyes:      Conjunctiva/sclera: Conjunctivae normal.      Pupils: Pupils are equal, round, and reactive to light.   Cardiovascular:      Rate and Rhythm: Normal rate and regular rhythm.      Heart sounds: Normal heart sounds.   Pulmonary:      Effort: Pulmonary effort is normal. No respiratory distress.      Breath sounds: Normal breath sounds.   Abdominal:      General: Bowel sounds are normal. There is no distension.      Palpations: Abdomen is soft.      Tenderness: There is no abdominal tenderness.   Musculoskeletal:         General: Swelling (left hand extending up the arm to left elbow) and tenderness present. Normal range of motion.      Cervical back: Normal range of motion and neck supple.   Lymphadenopathy:      Upper Body:      Left upper body: No axillary adenopathy.   Skin:     General: Skin is warm and dry.      Findings: Erythema present.      Comments: See photo   Neurological:      General: No focal deficit present.      Mental Status: She is alert and oriented to person, place, and time.   Psychiatric:         Mood and Affect: Mood normal.         Behavior: Behavior normal.         Thought Content: Thought content normal.              CRANIAL NERVES     CN III, IV, VI   Pupils are equal, round, and reactive to light.       Significant Labs: All pertinent labs within the past 24 hours have been reviewed.  CBC:   Recent Labs   Lab 04/16/25  0540   WBC 13.83*   HGB 11.2*   HCT 35.1*   *     CMP:   Recent Labs   Lab 04/16/25  0540      K 3.9   CO2 20*   BUN 13   CREATININE 1.0   CALCIUM 9.1    ALBUMIN 3.7   BILITOT 0.3   ALKPHOS 32*   AST 21   ALT 17       Magnesium:   Recent Labs   Lab 04/16/25  0540   MG 2.1       Significant Imaging: I have reviewed all pertinent imaging results/findings within the past 24 hours.

## 2025-04-16 NOTE — PROGRESS NOTES
Pt was not seen by Wound care for consultation of Left Hand.  I&D performed this AM.  Dressing was reinforced by Nursing.  Pt currently in MRI.  Will see Pt tomorrow with Dr. Powell.

## 2025-04-16 NOTE — PLAN OF CARE
Patient is a 50 year old female who was admitted with left hand abscess. Bedside I&D was done by ED physician.     Follow culture   Cont Rocephin and Vancomycin   Wound care

## 2025-04-17 ENCOUNTER — ANESTHESIA EVENT (OUTPATIENT)
Dept: SURGERY | Facility: HOSPITAL | Age: 50
End: 2025-04-17
Payer: COMMERCIAL

## 2025-04-17 LAB
ABSOLUTE EOSINOPHIL (SMH): 0.34 K/UL
ABSOLUTE MONOCYTE (SMH): 0.49 K/UL (ref 0.3–1)
ABSOLUTE NEUTROPHIL COUNT (SMH): 3.5 K/UL (ref 1.8–7.7)
ALBUMIN SERPL-MCNC: 3.2 G/DL (ref 3.5–5.2)
ALP SERPL-CCNC: 28 UNIT/L (ref 40–150)
ALT SERPL-CCNC: 14 UNIT/L (ref 10–44)
ANION GAP (SMH): 8 MMOL/L (ref 8–16)
AST SERPL-CCNC: 22 UNIT/L (ref 11–45)
BASOPHILS # BLD AUTO: 0.06 K/UL
BASOPHILS NFR BLD AUTO: 0.9 %
BILIRUB SERPL-MCNC: 0.2 MG/DL (ref 0.1–1)
BUN SERPL-MCNC: 11 MG/DL (ref 6–20)
CALCIUM SERPL-MCNC: 8.4 MG/DL (ref 8.7–10.5)
CHLORIDE SERPL-SCNC: 106 MMOL/L (ref 95–110)
CO2 SERPL-SCNC: 21 MMOL/L (ref 23–29)
CREAT SERPL-MCNC: 1 MG/DL (ref 0.5–1.4)
ERYTHROCYTE [DISTWIDTH] IN BLOOD BY AUTOMATED COUNT: 14.7 % (ref 11.5–14.5)
GFR SERPLBLD CREATININE-BSD FMLA CKD-EPI: >60 ML/MIN/1.73/M2
GLUCOSE SERPL-MCNC: 132 MG/DL (ref 70–110)
HCT VFR BLD AUTO: 35.3 % (ref 37–48.5)
HGB BLD-MCNC: 11.4 GM/DL (ref 12–16)
IMM GRANULOCYTES # BLD AUTO: 0.01 K/UL (ref 0–0.04)
IMM GRANULOCYTES NFR BLD AUTO: 0.1 % (ref 0–0.5)
LYMPHOCYTES # BLD AUTO: 2.29 K/UL (ref 1–4.8)
MAGNESIUM SERPL-MCNC: 2.1 MG/DL (ref 1.6–2.6)
MCH RBC QN AUTO: 26.6 PG (ref 27–31)
MCHC RBC AUTO-ENTMCNC: 32.3 G/DL (ref 32–36)
MCV RBC AUTO: 82 FL (ref 82–98)
NUCLEATED RBC (/100WBC) (SMH): 0 /100 WBC
PHOSPHATE SERPL-MCNC: 3.4 MG/DL (ref 2.7–4.5)
PLATELET # BLD AUTO: 470 K/UL (ref 150–450)
PMV BLD AUTO: 8.5 FL (ref 9.2–12.9)
POTASSIUM SERPL-SCNC: 4 MMOL/L (ref 3.5–5.1)
PROT SERPL-MCNC: 6.4 GM/DL (ref 6–8.4)
RBC # BLD AUTO: 4.29 M/UL (ref 4–5.4)
RELATIVE EOSINOPHIL (SMH): 5.1 % (ref 0–8)
RELATIVE LYMPHOCYTE (SMH): 34.1 % (ref 18–48)
RELATIVE MONOCYTE (SMH): 7.3 % (ref 4–15)
RELATIVE NEUTROPHIL (SMH): 52.5 % (ref 38–73)
SODIUM SERPL-SCNC: 135 MMOL/L (ref 136–145)
VANCOMYCIN TROUGH SERPL-MCNC: 9.8 UG/ML (ref 10–22)
WBC # BLD AUTO: 6.72 K/UL (ref 3.9–12.7)

## 2025-04-17 PROCEDURE — 99222 1ST HOSP IP/OBS MODERATE 55: CPT | Mod: ,,, | Performed by: FAMILY MEDICINE

## 2025-04-17 PROCEDURE — G0378 HOSPITAL OBSERVATION PER HR: HCPCS

## 2025-04-17 PROCEDURE — 80053 COMPREHEN METABOLIC PANEL: CPT | Performed by: NURSE PRACTITIONER

## 2025-04-17 PROCEDURE — 96376 TX/PRO/DX INJ SAME DRUG ADON: CPT

## 2025-04-17 PROCEDURE — 36415 COLL VENOUS BLD VENIPUNCTURE: CPT | Performed by: NURSE PRACTITIONER

## 2025-04-17 PROCEDURE — 99900035 HC TECH TIME PER 15 MIN (STAT)

## 2025-04-17 PROCEDURE — 25000003 PHARM REV CODE 250: Performed by: FAMILY MEDICINE

## 2025-04-17 PROCEDURE — 25000003 PHARM REV CODE 250: Performed by: NURSE PRACTITIONER

## 2025-04-17 PROCEDURE — 63600175 PHARM REV CODE 636 W HCPCS: Performed by: NURSE PRACTITIONER

## 2025-04-17 PROCEDURE — 80202 ASSAY OF VANCOMYCIN: CPT | Performed by: INTERNAL MEDICINE

## 2025-04-17 PROCEDURE — 85025 COMPLETE CBC W/AUTO DIFF WBC: CPT | Performed by: NURSE PRACTITIONER

## 2025-04-17 PROCEDURE — C1751 CATH, INF, PER/CENT/MIDLINE: HCPCS

## 2025-04-17 PROCEDURE — 84100 ASSAY OF PHOSPHORUS: CPT | Performed by: NURSE PRACTITIONER

## 2025-04-17 PROCEDURE — 76937 US GUIDE VASCULAR ACCESS: CPT

## 2025-04-17 PROCEDURE — 99223 1ST HOSP IP/OBS HIGH 75: CPT | Mod: ,,, | Performed by: ORTHOPAEDIC SURGERY

## 2025-04-17 PROCEDURE — 36410 VNPNXR 3YR/> PHY/QHP DX/THER: CPT

## 2025-04-17 PROCEDURE — 96366 THER/PROPH/DIAG IV INF ADDON: CPT

## 2025-04-17 PROCEDURE — 94761 N-INVAS EAR/PLS OXIMETRY MLT: CPT

## 2025-04-17 PROCEDURE — 25000003 PHARM REV CODE 250: Performed by: INTERNAL MEDICINE

## 2025-04-17 PROCEDURE — 83735 ASSAY OF MAGNESIUM: CPT | Performed by: NURSE PRACTITIONER

## 2025-04-17 RX ORDER — MUPIROCIN 20 MG/G
OINTMENT TOPICAL DAILY
Status: DISCONTINUED | OUTPATIENT
Start: 2025-04-17 | End: 2025-04-19 | Stop reason: HOSPADM

## 2025-04-17 RX ORDER — SODIUM CHLORIDE 0.9 % (FLUSH) 0.9 %
10 SYRINGE (ML) INJECTION EVERY 12 HOURS PRN
Status: DISCONTINUED | OUTPATIENT
Start: 2025-04-17 | End: 2025-04-19 | Stop reason: HOSPADM

## 2025-04-17 RX ADMIN — CEFTRIAXONE SODIUM 2 G: 2 INJECTION, POWDER, FOR SOLUTION INTRAMUSCULAR; INTRAVENOUS at 12:04

## 2025-04-17 RX ADMIN — HYDROCODONE BITARTRATE AND ACETAMINOPHEN 1 TABLET: 10; 325 TABLET ORAL at 02:04

## 2025-04-17 RX ADMIN — SENNOSIDES AND DOCUSATE SODIUM 1 TABLET: 50; 8.6 TABLET ORAL at 08:04

## 2025-04-17 RX ADMIN — SODIUM CHLORIDE 1000 MG: 9 INJECTION, SOLUTION INTRAVENOUS at 08:04

## 2025-04-17 RX ADMIN — HYDROCODONE BITARTRATE AND ACETAMINOPHEN 1 TABLET: 10; 325 TABLET ORAL at 08:04

## 2025-04-17 RX ADMIN — MUPIROCIN 1 G: 20 OINTMENT TOPICAL at 02:04

## 2025-04-17 RX ADMIN — CIPROFOLXACIN 750 MG: 250 TABLET ORAL at 08:04

## 2025-04-17 RX ADMIN — AMLODIPINE BESYLATE 10 MG: 5 TABLET ORAL at 08:04

## 2025-04-17 NOTE — PLAN OF CARE
Problem: Adult Inpatient Plan of Care  Goal: Plan of Care Review  Outcome: Progressing  Goal: Patient-Specific Goal (Individualized)  Outcome: Progressing  Goal: Absence of Hospital-Acquired Illness or Injury  Outcome: Progressing  Goal: Optimal Comfort and Wellbeing  Outcome: Progressing  Goal: Readiness for Transition of Care  Outcome: Progressing     Problem: Sepsis/Septic Shock  Goal: Optimal Coping  Outcome: Progressing  Goal: Absence of Bleeding  Outcome: Progressing  Goal: Blood Glucose Level Within Targeted Range  Outcome: Progressing  Goal: Absence of Infection Signs and Symptoms  Outcome: Progressing  Goal: Optimal Nutrition Intake  Outcome: Progressing     Problem: Skin Injury Risk Increased  Goal: Skin Health and Integrity  Outcome: Progressing   Plan of care reviewed with patient. Patient verbalized understanding. All fall precautions maintained. Bed in lowest position, call light within reach, slip resistant socks maintained. Bed alarm on.

## 2025-04-17 NOTE — CARE UPDATE
04/17/25 0737   Patient Assessment/Suction   Level of Consciousness (AVPU) alert   Respiratory Effort Unlabored   All Lung Fields Breath Sounds clear   PRE-TX-O2   Device (Oxygen Therapy) room air   SpO2 96 %   Pulse Oximetry Type Intermittent   $ Pulse Oximetry - Multiple Charge Pulse Oximetry - Multiple   Aerosol Therapy   $ Aerosol Therapy Charges PRN treatment not required

## 2025-04-17 NOTE — NURSING
SN notified Dr. Bryant who is on call for ortho of new consult. States he will see patient. Updated primary nurse Clarita

## 2025-04-17 NOTE — ASSESSMENT & PLAN NOTE
Patient's blood pressure range in the last 24 hours was: BP  Min: 119/58  Max: 145/74.The patient's inpatient anti-hypertensive regimen is listed below:  Current Antihypertensives  hydrALAZINE tablet 25 mg, Every 8 hours PRN, Oral  amLODIPine tablet 10 mg, Daily, Oral    Plan  - BP is uncontrolled, will adjust as follows: prn hydralazine  - needs OP f/u with PCP

## 2025-04-17 NOTE — CONSULTS
18 gauge x 10cm Midline placed to patient's right basilic vein with the use of ultrasound guidance.     Ultrasound guidance: yes  Vessel Caliber: large and patent, compressibility normal  Needle advanced into vessel with real time Ultrasound guidance.  Guidewire confirmed in vessel.  Image recorded and saved.  Sterile sheath used.  Sterile dressing applied  Arm circumference- 32CM  Dressing dated   Limb alert applied.

## 2025-04-17 NOTE — CONSULTS
"Chief complaint:  Wound Check (Left hand wound, currently on antibiotics)      HPI:  Karen Gomez is a 50 y.o. female presenting with Left hand surgical wound s/p I and D inn the ED. Pt was at the beach at [place], and hand swelled when she came home. Pt told nurse she had a spider bite prior to her [place] trip. Wound POA. No other complaints today    PMH:  As per HPI and below:  Past Medical History:   Diagnosis Date    Hypertension        Social History     Socioeconomic History    Marital status: Single   Tobacco Use    Smoking status: Every Day     Current packs/day: 1.00     Average packs/day: 1 pack/day for 12.0 years (12.0 ttl pk-yrs)     Types: Cigarettes, Vaping with nicotine    Smokeless tobacco: Never   Substance and Sexual Activity    Alcohol use: Yes     Alcohol/week: 2.0 standard drinks of alcohol     Types: 2 Glasses of wine per week    Drug use: No    Sexual activity: Yes       Past Surgical History:   Procedure Laterality Date    FINGER SURGERY      left hand middle       No family history on file.    Review of patient's allergies indicates:  No Known Allergies    Medications Ordered Prior to Encounter[1]    ROS: As per HPI and below:  Pertinent items are noted in HPI.      Physical Exam:     Vitals:    25 0316 25 0737 25 0814 25 1130   BP: (!) 119/58 139/75  124/76   BP Location:  Right arm     Patient Position: Lying Lying     Pulse: 71 87  (!) 18   Resp: 18 16 18 18   Temp: 97.9 °F (36.6 °C) 98.1 °F (36.7 °C)  98.1 °F (36.7 °C)   TempSrc: Oral Oral  Oral   SpO2: 95% 96%  97%   Weight:       Height:           BP  Min: 119/58  Max: 190/91  Temp  Av.2 °F (36.8 °C)  Min: 97.6 °F (36.4 °C)  Max: 98.9 °F (37.2 °C)  Pulse  Av.4  Min: 18  Max: 100  Resp  Av.6  Min: 16  Max: 19  SpO2  Av.6 %  Min: 95 %  Max: 100 %  Height  Av' 2" (157.5 cm)  Min: 5' 2" (157.5 cm)  Max: 5' 2" (157.5 cm)  Weight  Av.9 kg (165 lb 1 oz)  Min: 74.8 kg (165 lb)  Max: 74.9 " "kg (165 lb 2 oz)    Body mass index is 30.2 kg/m².          General:             Well developed, well nourished, no apparent distress  HEENT:              NCAT, no JVD, mucous membranes moist, EOM intact  Cardiovascular:  Regular rate and rhythm, normal S1, normal S2, No murmurs, rubs, or gallops  Respiratory:        Normal breath sounds, no wheezes, no rales, no rhonchi  Abdomen:           Bowel sounds present, non tender, non distended, no masses, no hepatojugular reflux  Extremities:        No clubbing, no cyanosis, no edema  Vascular:            2+ b/l radial.  Peripheral pulses intact.  No carotid bruits.  Neurological:      No focal deficits  Skin:                   No obvious rashes or erythema, Left hand surgical wound s/p I and D inn the ED      Lab Results   Component Value Date    WBC 6.72 04/17/2025    HGB 11.4 (L) 04/17/2025    HCT 35.3 (L) 04/17/2025    MCV 82 04/17/2025     (H) 04/17/2025     No results found for: "CHOL"  No results found for: "HDL"  No results found for: "LDLCALC"  No results found for: "TRIG"  No results found for: "CHOLHDL"  CMP    Assessment and Recommendations       Diagnoses:    Left hand surgical wound s/p I and D inn the ED    Plan:  Bactroban + AqAg to the open wound daily  FU at the OP clinic on DC  Complexity:    moderate         [1]   No current facility-administered medications on file prior to encounter.     Current Outpatient Medications on File Prior to Encounter   Medication Sig Dispense Refill    ciprofloxacin HCl (CIPRO) 500 MG tablet Take 1.5 tablets (750 mg total) by mouth every 12 (twelve) hours. for 10 days 30 tablet 0    HYDROcodone-acetaminophen (NORCO) 7.5-325 mg per tablet Take 1 tablet by mouth every 8 (eight) hours as needed for Pain. 12 tablet 0    ibuprofen (ADVIL,MOTRIN) 600 MG tablet Take 1 tablet (600 mg total) by mouth every 6 (six) hours as needed for Pain. 20 tablet 0    mupirocin (BACTROBAN) 2 % ointment Apply topically 3 (three) times " daily. 30 g 0    ondansetron (ZOFRAN-ODT) 4 MG TbDL Take 1 tablet (4 mg total) by mouth every 8 (eight) hours as needed. 12 tablet 0    sulfamethoxazole-trimethoprim 800-160mg (BACTRIM DS) 800-160 mg Tab Take 1 tablet by mouth 2 (two) times daily. for 7 days 14 tablet 0

## 2025-04-17 NOTE — CONSULTS
[facility]/Surg  Wound Care    Patient Name:  Karen Gomez   MRN:  [id number]  Date: 4/17/2025  Diagnosis: Abscess of left hand    History:     Past Medical History:   Diagnosis Date    Hypertension        Social History[1]    Precautions:     Allergies as of 04/16/2025    (No Known Allergies)       WO Assessment Details/Treatment     Pt seen by Wound Care with Dr. Powell for consultation to Left hand/5th finger I&D wound.  Pt had I&D done on 4/16.  Removed dressing, small creamy drainage noted to dressing.  No active drainage noted from wound.  Wound noted with mixed red tissue and yellow tissue.  Area noted with swelling and maceration to mallorie wound.  Cleaned with soap and water.  Applied Aquacel ag to wound.  Wrapped with gauze dressing.      Pt scheduled for I&D to left hand/5th finger wound tomorrow in the OR.       04/17/25 1000   WOCN Assessment   WOCN Total Time (mins) 50   Visit Date 04/17/25   Visit Time 1000   Consult Type New   WOCN Speciality Wound   Wound I&D;cellulitis   Intervention assessed;changed;applied;chart review;orders   Teaching on-going        Wound 04/17/25 1000 Abscess Left Finger, fifth   Date First Assessed/Time First Assessed: 04/17/25 1000   Present on Original Admission: Yes  Primary Wound Type: Abscess  Side: Left  Location: Finger, fifth   Wound Image    Dressing Appearance Intact;Moist drainage   Drainage Amount Small   Drainage Characteristics/Odor Creamy;Tan   Appearance Red;Maroon;Tan;White;Moist;Ecchymotic   Periwound Area Ecchymotic;Excoriated;Maroon;Moist;Redness   Wound Edges Irregular   Wound Length (cm) 3.5 cm   Wound Width (cm) 2 cm   Wound Depth (cm) 0.2 cm  (unable to measure due to pain)   Wound Volume (cm^3) 0.733 cm^3   Wound Surface Area (cm^2) 5.5 cm^2   Care Cleansed with:;Soap and water   Dressing Silver;Hydrofiber;Gauze;Rolled gauze   Periwound Care Cleansed with pH balanced cleanser;Dry periwound area maintained;Absorptive dressing applied        Wound Care:  Left 5th finger--s/p I&D (4/16)--clean wound with soap and water.  Apply Mupirocin to wound with Aquacel AG.  Cover with gauze and non-stretch coban.       Orders placed.     04/17/2025         [1]   Social History  Socioeconomic History    Marital status: Single   Tobacco Use    Smoking status: Every Day     Current packs/day: 1.00     Average packs/day: 1 pack/day for 12.0 years (12.0 ttl pk-yrs)     Types: Cigarettes, Vaping with nicotine    Smokeless tobacco: Never   Substance and Sexual Activity    Alcohol use: Yes     Alcohol/week: 2.0 standard drinks of alcohol     Types: 2 Glasses of wine per week    Drug use: No    Sexual activity: Yes

## 2025-04-17 NOTE — CONSULTS
[facility]/Surg  Orthopedics  Consult Note    Patient Name: Karen Gomez  MRN: [id number]  Admission Date: 4/16/2025  Hospital Length of Stay: 0 days  Attending Provider: Francis Garcia MD  Primary Care Provider: Negra Primary Doctor    Patient information was obtained from patient and ER records.     Inpatient consult to Orthopedic Surgery  Consult performed by: Will Bryant MD  Consult ordered by: Francis Garcia MD        Subjective:     Principal Problem:Abscess of left hand    Chief Complaint:   Chief Complaint   Patient presents with    Wound Check     Left hand wound, currently on antibiotics        HPI:  Pleasant 50-year-old female otherwise healthy who presented to the emergency department yesterday with a chief complaint of left hand abscess.  She states she may have been bitten by a spider last Thursday.  Her symptoms worsened including redness and pain and was seen over the weekend and prescribed oral antibiotics.  She presented back to the emergency department yesterday where at bedside I and D was performed.  She also had an MRI done of the hand and fingers.  Does report a history of MRSA in the past on the leg.    Past Medical History:   Diagnosis Date    Hypertension        Past Surgical History:   Procedure Laterality Date    FINGER SURGERY      left hand middle       Review of patient's allergies indicates:  No Known Allergies    Current Facility-Administered Medications   Medication    acetaminophen tablet 650 mg    albuterol-ipratropium 2.5 mg-0.5 mg/3 mL nebulizer solution 3 mL    aluminum-magnesium hydroxide-simethicone 200-200-20 mg/5 mL suspension 30 mL    amLODIPine tablet 10 mg    cefTRIAXone injection 2 g    ciprofloxacin HCl tablet 750 mg    dextrose 50% injection 12.5 g    dextrose 50% injection 25 g    glucagon (human recombinant) injection 1 mg    glucose chewable tablet 16 g    glucose chewable tablet 24 g    hydrALAZINE tablet 25 mg     HYDROcodone-acetaminophen  mg per tablet 1 tablet    HYDROcodone-acetaminophen 5-325 mg per tablet 1 tablet    magnesium oxide tablet 800 mg    magnesium oxide tablet 800 mg    melatonin tablet 6 mg    naloxone 0.4 mg/mL injection 0.02 mg    ondansetron injection 4 mg    potassium bicarbonate disintegrating tablet 35 mEq    potassium bicarbonate disintegrating tablet 50 mEq    potassium bicarbonate disintegrating tablet 60 mEq    potassium, sodium phosphates 280-160-250 mg packet 2 packet    potassium, sodium phosphates 280-160-250 mg packet 2 packet    potassium, sodium phosphates 280-160-250 mg packet 2 packet    prochlorperazine injection Soln 5 mg    senna-docusate 8.6-50 mg per tablet 1 tablet    simethicone chewable tablet 80 mg    sodium chloride 0.9% flush 10 mL    sodium chloride 0.9% flush 10 mL    vancomycin (VANCOCIN) 1,000 mg in 0.9% NaCl 250 mL IVPB (admixture device)    vancomycin - pharmacy to dose     Family History    None       Tobacco Use    Smoking status: Every Day     Current packs/day: 1.00     Average packs/day: 1 pack/day for 12.0 years (12.0 ttl pk-yrs)     Types: Cigarettes, Vaping with nicotine    Smokeless tobacco: Never   Substance and Sexual Activity    Alcohol use: Yes     Alcohol/week: 2.0 standard drinks of alcohol     Types: 2 Glasses of wine per week    Drug use: No    Sexual activity: Yes     ROS    Review of Systems:  Constitutional: no fever or chills  Eyes: no visual changes  ENT: no nasal congestion or sore throat  Respiratory: no cough or shortness of breath  Cardiovascular: no chest pain  Gastrointestinal: no nausea or vomiting, tolerating diet  Musculoskeletal: pain and soreness  All others negative    Objective:     Vital Signs (Most Recent):  Temp: 98.1 °F (36.7 °C) (04/17/25 0737)  Pulse: 87 (04/17/25 0737)  Resp: 18 (04/17/25 0814)  BP: 139/75 (04/17/25 0737)  SpO2: 96 % (04/17/25 0737) Vital Signs (24h Range):  Temp:  [97.6 °F (36.4 °C)-98.4 °F (36.9 °C)] 98.1  "°F (36.7 °C)  Pulse:  [71-98] 87  Resp:  [16-19] 18  SpO2:  [95 %-98 %] 96 %  BP: (119-184)/(58-87) 139/75     Weight: 74.9 kg (165 lb 2 oz)  Height: 5' 2" (157.5 cm)  Body mass index is 30.2 kg/m².      Intake/Output Summary (Last 24 hours) at 4/17/2025 1107  Last data filed at 4/17/2025 0434  Gross per 24 hour   Intake 1200 ml   Output --   Net 1200 ml       Ortho/SPM Exam    On exam she is alert and oriented no acute on the dorsal ulnar aspect of the hand along the 5th MCP joint is a palpable fluid collection with a area of central necrosis.  This is desquamation of the skin.  There is surrounding induration.  Able to express a scant amount of serous and purulent fluid.    Significant Labs: BMP:   Recent Labs   Lab 04/17/25  0544   *   K 4.0   CO2 21*   BUN 11   CREATININE 1.0   CALCIUM 8.4*   MG 2.1     CBC:   Recent Labs   Lab 04/16/25  0540 04/17/25  0544   WBC 13.83* 6.72   HGB 11.2* 11.4*   HCT 35.1* 35.3*   * 470*     CRP:   Recent Labs   Lab 04/16/25  0540   CRP 97.3*     Wound Culture: No results for input(s): "LABAERO" in the last 48 hours.  All pertinent labs within the past 24 hours have been reviewed.    Significant Imaging: MRI: I have reviewed all pertinent results/findings and my personal findings are:  The underlying 5th metacarpal and proximal phalanx of the 5th finger shows normal MR bone marrow signal without evidence of osteomyelitis.  A joint effusion in the metacarpophalangeal joint consistent with septic arthritis is not demonstrated.  Abutting the joint capsule is a 1 cm by 3 mm fluid collection best seen series 7, image 14.  This is deep to the soft tissue phlegmon remaining    Assessment/Plan:     Active Diagnoses:    Diagnosis Date Noted POA    PRINCIPAL PROBLEM:  Abscess of left hand [L02.512] 04/16/2025 Yes    Primary hypertension [I10] 04/16/2025 Yes    Nicotine dependence [F17.200] 04/16/2025 Yes    Sepsis [A41.9] 04/16/2025 Yes      Problems Resolved During this " Admission:     Treatment options were discussed with her in detail.  I went over her x-rays and MRI findings.  Clinically this appears to be a dorsal hand abscess, possible MRSA based on history.  Recommend formal irrigation and debridement in the operating room.  She has eaten breakfast this morning.  We will plan for OR tomorrow.  NPO at midnight.  Risks and benefits of the procedure were explained to her in detail and she elects to proceed.    Thank you for your consult. I will follow-up with patient. Please contact us if you have any additional questions.    Will Bryant MD  Orthopedics  [facility]/Surg

## 2025-04-17 NOTE — HOSPITAL COURSE
Karen Gomez is a 50 year old female with a past medical history of obesity and nicotine dependence who presented with a MRSA abscess to the left hand. She underwent I and D in the ED followed by further drainage in the OR 4/18 with Orthopedic Surgery. Cultures show MRSA. She is on vancomycin (failed prior oral antibiotic therapy). Inflammatory markers improved during her course. She was discharged 4/19 with clindamycin to complete a two week course and will follow up with Primary Care and Wound Care.

## 2025-04-17 NOTE — ASSESSMENT & PLAN NOTE
With exposure to salt water  IV ceftriaxone, oral cipro, IV vanc  MRI left hand Focal abscess with cellulitis overlying the 5th metacarpophalangeal joint status post I and D. a drainable abscess is not seen. Overlying the joint capsule is a small 1 cm x 0.3 cm fluid collection.. Underlying septic arthritis or osteomyelitis is not seen.   I&D performed in the ED.  May need further washout.  Consult wound care  Pain control  Ortho consulted

## 2025-04-17 NOTE — PROGRESS NOTES
"[facility] Medicine  Progress Note    Patient Name: Karen Gomez  MRN: [id number]  Patient Class: OP- Observation   Admission Date: 4/16/2025  Length of Stay: 0 days  Attending Physician: Francis Garcia MD  Primary Care Provider: Negra, Primary Doctor        Subjective     Principal Problem:Abscess of left hand        HPI:  Karen Gomez is a 49 y/o F with a past medical history of HTN, not on chronic antihypertensives, who presented to the ED with abscess to the left hand.  She states that she had "a spider bite" to that location that she noticed about a week ago.  At that time she was in [place] and there was salt water exposure to the wound.  She reports that night she noticed redness and some swelling to the left hand.  3 days later she was seen in the ED for this problem.  Hospital admission was recommended at that time; however, patient declined.  She was placed on oral bactrim and oral cipro with which she reports compliance; however, erythema and swelling have continued and an obvious abscess has formed.  I and D performed in the ED with moderate purulent drainage, cultures obtained.  Hospital medicine to admit for continued medical management.       Overview/Hospital Course:  Patient is a 50 year old female who was admitted on right hand abscess after a spider bite. She has a history of HTN. Patient had bedside I&D by the ED provider and was started on Rocephin and Vancomycin. Orthopedics and wound care consulted.     Interval History: Patient is afebrile. Complains of pain on the left hand. Swelling and redness improved compared to the pictures on admission. However there is still pus draining from the small incision.     Review of Systems  Objective:     Vital Signs (Most Recent):  Temp: 98.1 °F (36.7 °C) (04/17/25 1130)  Pulse: (!) 18 (04/17/25 1130)  Resp: 18 (04/17/25 1130)  BP: 124/76 (04/17/25 1130)  SpO2: 97 % (04/17/25 1130) Vital Signs (24h Range):  Temp:  [97.6 " °F (36.4 °C)-98.3 °F (36.8 °C)] 98.1 °F (36.7 °C)  Pulse:  [18-87] 18  Resp:  [16-19] 18  SpO2:  [95 %-98 %] 97 %  BP: (119-145)/(58-76) 124/76     Weight: 74.9 kg (165 lb 2 oz)  Body mass index is 30.2 kg/m².    Intake/Output Summary (Last 24 hours) at 4/17/2025 1256  Last data filed at 4/17/2025 0434  Gross per 24 hour   Intake 720 ml   Output --   Net 720 ml         Physical Exam  Vitals and nursing note reviewed.   Constitutional:       General: She is not in acute distress.     Appearance: She is not toxic-appearing.   HENT:      Head: Atraumatic.      Mouth/Throat:      Mouth: Mucous membranes are moist.      Pharynx: Oropharynx is clear.   Eyes:      General: No scleral icterus.     Conjunctiva/sclera: Conjunctivae normal.      Pupils: Pupils are equal, round, and reactive to light.   Cardiovascular:      Rate and Rhythm: Normal rate and regular rhythm.      Heart sounds: No murmur heard.  Pulmonary:      Effort: No respiratory distress.      Breath sounds: No wheezing, rhonchi or rales.   Abdominal:      General: Abdomen is flat. Bowel sounds are normal.      Palpations: Abdomen is soft.   Musculoskeletal:         General: No swelling or deformity.      Cervical back: No rigidity or tenderness.      Comments: Left hand is swollen with pus draining from the incision. Swelling and redness improved compared to the picture on admission.    Skin:     Coloration: Skin is not jaundiced or pale.      Findings: No bruising, erythema or rash.   Neurological:      General: No focal deficit present.      Mental Status: She is alert and oriented to person, place, and time.      Cranial Nerves: No cranial nerve deficit.      Sensory: No sensory deficit.      Motor: No weakness.   Psychiatric:         Mood and Affect: Mood normal.         Behavior: Behavior normal.               Significant Labs: All pertinent labs within the past 24 hours have been reviewed.  CBC:   Recent Labs   Lab 04/16/25  0540 04/17/25  0544   WBC  13.83* 6.72   HGB 11.2* 11.4*   HCT 35.1* 35.3*   * 470*     CMP:   Recent Labs   Lab 04/16/25  0540 04/17/25  0544    135*   K 3.9 4.0   CO2 20* 21*   BUN 13 11   CREATININE 1.0 1.0   CALCIUM 9.1 8.4*   ALBUMIN 3.7 3.2*   BILITOT 0.3 0.2   ALKPHOS 32* 28*   AST 21 22   ALT 17 14       Significant Imaging: I have reviewed all pertinent imaging results/findings within the past 24 hours.      Assessment & Plan  Abscess of left hand  With exposure to salt water  IV ceftriaxone, oral cipro, IV vanc  MRI left hand Focal abscess with cellulitis overlying the 5th metacarpophalangeal joint status post I and D. a drainable abscess is not seen. Overlying the joint capsule is a small 1 cm x 0.3 cm fluid collection.. Underlying septic arthritis or osteomyelitis is not seen.   I&D performed in the ED.  May need further washout.  Consult wound care  Pain control  Ortho consulted     Primary hypertension  Patient's blood pressure range in the last 24 hours was: BP  Min: 119/58  Max: 145/74.The patient's inpatient anti-hypertensive regimen is listed below:  Current Antihypertensives  hydrALAZINE tablet 25 mg, Every 8 hours PRN, Oral  amLODIPine tablet 10 mg, Daily, Oral    Plan  - BP is uncontrolled, will adjust as follows: prn hydralazine  - needs OP f/u with PCP  Nicotine dependence  Dangers of vaping with nicotine were reviewed with patient in detail. Patient was Counseled for 3-10 minutes. Nicotine replacement options were discussed. Nicotine replacement was discussed- not prescribed per patient's request  Sepsis  See hand abscess above   VTE Risk Mitigation (From admission, onward)           Ordered     IP VTE HIGH RISK PATIENT  Once         04/16/25 0543     Place sequential compression device  Until discontinued         04/16/25 0543     Reason for No Pharmacological VTE Prophylaxis  Once        Question:  Reasons:  Answer:  Patient is Ambulatory    04/16/25 0543                    Discharge Planning   HAIDER:  4/18/2025     Code Status: Full Code   Medical Readiness for Discharge Date:   Discharge Plan A: Home with family                        Francis Garcia MD  Department of Hospital Medicine   [facility]/Surg

## 2025-04-17 NOTE — CARE UPDATE
04/16/25 1939   Patient Assessment/Suction   Level of Consciousness (AVPU) alert   Respiratory Effort Normal;Unlabored   Expansion/Accessory Muscles/Retractions expansion symmetric;no retractions;no use of accessory muscles   All Lung Fields Breath Sounds clear   PRE-TX-O2   Device (Oxygen Therapy) room air   SpO2 98 %   Pulse Oximetry Type Intermittent   Pulse 80   Resp 18   Aerosol Therapy   $ Aerosol Therapy Charges PRN treatment not required   Respiratory Treatment Status (SVN) PRN treatment not required

## 2025-04-17 NOTE — PROGRESS NOTES
Pharmacokinetic Assessment Follow Up: IV Vancomycin    Vancomycin serum concentration assessment(s):    The trough level was drawn correctly and can be used to guide therapy at this time. The measurement is below the desired definitive target range of 10 to 15 mcg/mL.    Vancomycin Regimen Plan:    Continue regimen to Vancomycin 1000 mg IV every 12 hours with next serum trough concentration measured at 1830 prior to 4TH dose on 4/18    Drug levels (last 3 results):  Recent Labs   Lab Result Units 04/17/25  0544   Vancomycin Trough ug/ml 9.8*       Pharmacy will continue to follow and monitor vancomycin.    Please contact pharmacy at extension [phone] for questions regarding this assessment.    Thank you for the consult,   Michel Nguyen       Patient brief summary:  Karen Gomez is a 50 y.o. female initiated on antimicrobial therapy with IV Vancomycin for treatment of skin & soft tissue infection    Drug Allergies:   Review of patient's allergies indicates:  No Known Allergies    Actual Body Weight:   74.9 KG    Renal Function:   Estimated Creatinine Clearance: 64.5 mL/min (based on SCr of 1 mg/dL).,     Dialysis Method (if applicable):  N/A    CBC (last 72 hours):  Recent Labs   Lab Result Units 04/16/25  0540 04/17/25  0544   WBC K/uL 13.83* 6.72   Hgb gm/dL 11.2* 11.4*   Hct % 35.1* 35.3*   Platelet Count K/uL 528* 470*   Mono % % 7.9 7.3   Eos % % 2.4 5.1   Basophil % % 0.7 0.9       Metabolic Panel (last 72 hours):  Recent Labs   Lab Result Units 04/16/25  0540 04/17/25  0544   Sodium mmol/L 136 135*   Potassium mmol/L 3.9 4.0   Chloride mmol/L 104 106   CO2 mmol/L 20* 21*   Glucose mg/dL 119* 132*   BUN mg/dL 13 11   Creatinine mg/dL 1.0 1.0   Albumin g/dL 3.7 3.2*   Bilirubin Total mg/dL 0.3 0.2   ALP unit/L 32* 28*   AST unit/L 21 22   ALT unit/L 17 14   Magnesium mg/dL 2.1 2.1   Phosphorus Level mg/dL 3.6 3.4       Vancomycin Administrations:  vancomycin given in the last 96 hours                     vancomycin  (VANCOCIN) 1,000 mg in 0.9% NaCl 250 mL IVPB (admixture device) (mg) 1,000 mg New Bag 04/16/25 1845    vancomycin 1,500 mg in 0.9% NaCl 250 mL IVPB (admixture device) (mg) 1,500 mg New Bag 04/16/25 0633                    Microbiologic Results:  Microbiology Results (last 7 days)       Procedure Component Value Units Date/Time    Blood Culture #1 [[id number]]  (Normal) Collected: 04/16/25 0540    Order Status: Completed Specimen: Blood from Peripheral, Forearm, Left Updated: 04/16/25 1601     CULTURE, BLOOD ([facility]) No Growth After 6 Hours    Blood Culture #2 [[id number]]  (Normal) Collected: 04/16/25 0540    Order Status: Completed Specimen: Blood from Peripheral, Hand, Left Updated: 04/16/25 1601     CULTURE, BLOOD ([facility]) No Growth After 6 Hours    Aerobic culture (Specify Source) **CANNOT BE ORDERED AS STAT** [[id number]] Collected: 04/16/25 0535    Order Status: Sent Specimen: Abscess from Hand, Left Updated: 04/16/25 0557

## 2025-04-17 NOTE — SUBJECTIVE & OBJECTIVE
Interval History: Patient is afebrile. Complains of pain on the left hand. Swelling and redness improved compared to the pictures on admission. However there is still pus draining from the small incision.     Review of Systems  Objective:     Vital Signs (Most Recent):  Temp: 98.1 °F (36.7 °C) (04/17/25 1130)  Pulse: (!) 18 (04/17/25 1130)  Resp: 18 (04/17/25 1130)  BP: 124/76 (04/17/25 1130)  SpO2: 97 % (04/17/25 1130) Vital Signs (24h Range):  Temp:  [97.6 °F (36.4 °C)-98.3 °F (36.8 °C)] 98.1 °F (36.7 °C)  Pulse:  [18-87] 18  Resp:  [16-19] 18  SpO2:  [95 %-98 %] 97 %  BP: (119-145)/(58-76) 124/76     Weight: 74.9 kg (165 lb 2 oz)  Body mass index is 30.2 kg/m².    Intake/Output Summary (Last 24 hours) at 4/17/2025 1256  Last data filed at 4/17/2025 0434  Gross per 24 hour   Intake 720 ml   Output --   Net 720 ml         Physical Exam  Vitals and nursing note reviewed.   Constitutional:       General: She is not in acute distress.     Appearance: She is not toxic-appearing.   HENT:      Head: Atraumatic.      Mouth/Throat:      Mouth: Mucous membranes are moist.      Pharynx: Oropharynx is clear.   Eyes:      General: No scleral icterus.     Conjunctiva/sclera: Conjunctivae normal.      Pupils: Pupils are equal, round, and reactive to light.   Cardiovascular:      Rate and Rhythm: Normal rate and regular rhythm.      Heart sounds: No murmur heard.  Pulmonary:      Effort: No respiratory distress.      Breath sounds: No wheezing, rhonchi or rales.   Abdominal:      General: Abdomen is flat. Bowel sounds are normal.      Palpations: Abdomen is soft.   Musculoskeletal:         General: No swelling or deformity.      Cervical back: No rigidity or tenderness.      Comments: Left hand is swollen with pus draining from the incision. Swelling and redness improved compared to the picture on admission.    Skin:     Coloration: Skin is not jaundiced or pale.      Findings: No bruising, erythema or rash.   Neurological:       General: No focal deficit present.      Mental Status: She is alert and oriented to person, place, and time.      Cranial Nerves: No cranial nerve deficit.      Sensory: No sensory deficit.      Motor: No weakness.   Psychiatric:         Mood and Affect: Mood normal.         Behavior: Behavior normal.               Significant Labs: All pertinent labs within the past 24 hours have been reviewed.  CBC:   Recent Labs   Lab 04/16/25  0540 04/17/25  0544   WBC 13.83* 6.72   HGB 11.2* 11.4*   HCT 35.1* 35.3*   * 470*     CMP:   Recent Labs   Lab 04/16/25  0540 04/17/25  0544    135*   K 3.9 4.0   CO2 20* 21*   BUN 13 11   CREATININE 1.0 1.0   CALCIUM 9.1 8.4*   ALBUMIN 3.7 3.2*   BILITOT 0.3 0.2   ALKPHOS 32* 28*   AST 21 22   ALT 17 14       Significant Imaging: I have reviewed all pertinent imaging results/findings within the past 24 hours.

## 2025-04-18 ENCOUNTER — ANESTHESIA (OUTPATIENT)
Dept: SURGERY | Facility: HOSPITAL | Age: 50
End: 2025-04-18
Payer: MEDICAID

## 2025-04-18 LAB
ABSOLUTE EOSINOPHIL (SMH): 0.36 K/UL
ABSOLUTE MONOCYTE (SMH): 0.47 K/UL (ref 0.3–1)
ABSOLUTE NEUTROPHIL COUNT (SMH): 3.8 K/UL (ref 1.8–7.7)
ALBUMIN SERPL-MCNC: 2.9 G/DL (ref 3.5–5.2)
ALP SERPL-CCNC: 27 UNIT/L (ref 40–150)
ALT SERPL-CCNC: 13 UNIT/L (ref 10–44)
ANION GAP (SMH): 10 MMOL/L (ref 8–16)
AST SERPL-CCNC: 27 UNIT/L (ref 11–45)
BACTERIA SPEC AEROBE CULT: ABNORMAL
BASOPHILS # BLD AUTO: 0.1 K/UL
BASOPHILS NFR BLD AUTO: 1.3 %
BILIRUB SERPL-MCNC: 0.1 MG/DL (ref 0.1–1)
BUN SERPL-MCNC: 12 MG/DL (ref 6–20)
CALCIUM SERPL-MCNC: 8.3 MG/DL (ref 8.7–10.5)
CHLORIDE SERPL-SCNC: 105 MMOL/L (ref 95–110)
CO2 SERPL-SCNC: 21 MMOL/L (ref 23–29)
CREAT SERPL-MCNC: 0.8 MG/DL (ref 0.5–1.4)
ERYTHROCYTE [DISTWIDTH] IN BLOOD BY AUTOMATED COUNT: 14.6 % (ref 11.5–14.5)
GFR SERPLBLD CREATININE-BSD FMLA CKD-EPI: >60 ML/MIN/1.73/M2
GLUCOSE SERPL-MCNC: 121 MG/DL (ref 70–110)
HCT VFR BLD AUTO: 34.3 % (ref 37–48.5)
HGB BLD-MCNC: 10.8 GM/DL (ref 12–16)
HOLD SPECIMEN: NORMAL
IMM GRANULOCYTES # BLD AUTO: 0.02 K/UL (ref 0–0.04)
IMM GRANULOCYTES NFR BLD AUTO: 0.3 % (ref 0–0.5)
LYMPHOCYTES # BLD AUTO: 3.06 K/UL (ref 1–4.8)
MAGNESIUM SERPL-MCNC: 1.9 MG/DL (ref 1.6–2.6)
MCH RBC QN AUTO: 26 PG (ref 27–31)
MCHC RBC AUTO-ENTMCNC: 31.5 G/DL (ref 32–36)
MCV RBC AUTO: 83 FL (ref 82–98)
NUCLEATED RBC (/100WBC) (SMH): 0 /100 WBC
PHOSPHATE SERPL-MCNC: 3.4 MG/DL (ref 2.7–4.5)
PLATELET # BLD AUTO: 488 K/UL (ref 150–450)
PMV BLD AUTO: 8.6 FL (ref 9.2–12.9)
POTASSIUM SERPL-SCNC: 3.8 MMOL/L (ref 3.5–5.1)
PROT SERPL-MCNC: 6 GM/DL (ref 6–8.4)
RBC # BLD AUTO: 4.15 M/UL (ref 4–5.4)
RELATIVE EOSINOPHIL (SMH): 4.6 % (ref 0–8)
RELATIVE LYMPHOCYTE (SMH): 39.4 % (ref 18–48)
RELATIVE MONOCYTE (SMH): 6 % (ref 4–15)
RELATIVE NEUTROPHIL (SMH): 48.4 % (ref 38–73)
SODIUM SERPL-SCNC: 136 MMOL/L (ref 136–145)
VANCOMYCIN TROUGH SERPL-MCNC: 9.6 UG/ML (ref 10–22)
WBC # BLD AUTO: 7.77 K/UL (ref 3.9–12.7)

## 2025-04-18 PROCEDURE — 63600175 PHARM REV CODE 636 W HCPCS: Performed by: NURSE PRACTITIONER

## 2025-04-18 PROCEDURE — 71000039 HC RECOVERY, EACH ADD'L HOUR: Performed by: ORTHOPAEDIC SURGERY

## 2025-04-18 PROCEDURE — 25000003 PHARM REV CODE 250: Performed by: NURSE PRACTITIONER

## 2025-04-18 PROCEDURE — 94799 UNLISTED PULMONARY SVC/PX: CPT

## 2025-04-18 PROCEDURE — 84100 ASSAY OF PHOSPHORUS: CPT | Performed by: NURSE PRACTITIONER

## 2025-04-18 PROCEDURE — 37000008 HC ANESTHESIA 1ST 15 MINUTES: Performed by: ORTHOPAEDIC SURGERY

## 2025-04-18 PROCEDURE — 25000003 PHARM REV CODE 250: Performed by: NURSE ANESTHETIST, CERTIFIED REGISTERED

## 2025-04-18 PROCEDURE — 94761 N-INVAS EAR/PLS OXIMETRY MLT: CPT

## 2025-04-18 PROCEDURE — 99900035 HC TECH TIME PER 15 MIN (STAT)

## 2025-04-18 PROCEDURE — 25000003 PHARM REV CODE 250: Performed by: INTERNAL MEDICINE

## 2025-04-18 PROCEDURE — 25000003 PHARM REV CODE 250: Performed by: ORTHOPAEDIC SURGERY

## 2025-04-18 PROCEDURE — 11000001 HC ACUTE MED/SURG PRIVATE ROOM

## 2025-04-18 PROCEDURE — 37000009 HC ANESTHESIA EA ADD 15 MINS: Performed by: ORTHOPAEDIC SURGERY

## 2025-04-18 PROCEDURE — 10061 I&D ABSCESS COMP/MULTIPLE: CPT | Mod: ,,, | Performed by: ORTHOPAEDIC SURGERY

## 2025-04-18 PROCEDURE — 36000705 HC OR TIME LEV I EA ADD 15 MIN: Performed by: ORTHOPAEDIC SURGERY

## 2025-04-18 PROCEDURE — 63600175 PHARM REV CODE 636 W HCPCS: Performed by: NURSE ANESTHETIST, CERTIFIED REGISTERED

## 2025-04-18 PROCEDURE — 87070 CULTURE OTHR SPECIMN AEROBIC: CPT | Performed by: ORTHOPAEDIC SURGERY

## 2025-04-18 PROCEDURE — 36415 COLL VENOUS BLD VENIPUNCTURE: CPT | Performed by: INTERNAL MEDICINE

## 2025-04-18 PROCEDURE — 36000704 HC OR TIME LEV I 1ST 15 MIN: Performed by: ORTHOPAEDIC SURGERY

## 2025-04-18 PROCEDURE — 0JBK0ZZ EXCISION OF LEFT HAND SUBCUTANEOUS TISSUE AND FASCIA, OPEN APPROACH: ICD-10-PCS | Performed by: ORTHOPAEDIC SURGERY

## 2025-04-18 PROCEDURE — 25000003 PHARM REV CODE 250: Performed by: ANESTHESIOLOGY

## 2025-04-18 PROCEDURE — 71000033 HC RECOVERY, INTIAL HOUR: Performed by: ORTHOPAEDIC SURGERY

## 2025-04-18 PROCEDURE — 63600175 PHARM REV CODE 636 W HCPCS: Mod: JZ | Performed by: ANESTHESIOLOGY

## 2025-04-18 PROCEDURE — 80053 COMPREHEN METABOLIC PANEL: CPT | Performed by: NURSE PRACTITIONER

## 2025-04-18 PROCEDURE — 36415 COLL VENOUS BLD VENIPUNCTURE: CPT | Performed by: NURSE PRACTITIONER

## 2025-04-18 PROCEDURE — 96365 THER/PROPH/DIAG IV INF INIT: CPT | Mod: 59

## 2025-04-18 PROCEDURE — 83735 ASSAY OF MAGNESIUM: CPT | Performed by: NURSE PRACTITIONER

## 2025-04-18 PROCEDURE — 87075 CULTR BACTERIA EXCEPT BLOOD: CPT | Performed by: ORTHOPAEDIC SURGERY

## 2025-04-18 PROCEDURE — 87102 FUNGUS ISOLATION CULTURE: CPT | Performed by: ORTHOPAEDIC SURGERY

## 2025-04-18 PROCEDURE — 80202 ASSAY OF VANCOMYCIN: CPT | Performed by: ORTHOPAEDIC SURGERY

## 2025-04-18 PROCEDURE — 85025 COMPLETE CBC W/AUTO DIFF WBC: CPT | Performed by: NURSE PRACTITIONER

## 2025-04-18 RX ORDER — MIDAZOLAM HYDROCHLORIDE 1 MG/ML
INJECTION INTRAMUSCULAR; INTRAVENOUS
Status: DISCONTINUED | OUTPATIENT
Start: 2025-04-18 | End: 2025-04-18

## 2025-04-18 RX ORDER — SODIUM CHLORIDE 0.9 % (FLUSH) 0.9 %
10 SYRINGE (ML) INJECTION
Status: DISCONTINUED | OUTPATIENT
Start: 2025-04-18 | End: 2025-04-18 | Stop reason: HOSPADM

## 2025-04-18 RX ORDER — LIDOCAINE HYDROCHLORIDE 20 MG/ML
INJECTION, SOLUTION EPIDURAL; INFILTRATION; INTRACAUDAL; PERINEURAL
Status: DISCONTINUED | OUTPATIENT
Start: 2025-04-18 | End: 2025-04-18

## 2025-04-18 RX ORDER — PHENYLEPHRINE HCL IN 0.9% NACL 1 MG/10 ML
SYRINGE (ML) INTRAVENOUS
Status: DISCONTINUED | OUTPATIENT
Start: 2025-04-18 | End: 2025-04-18

## 2025-04-18 RX ORDER — CEFAZOLIN SODIUM 1 G/3ML
INJECTION, POWDER, FOR SOLUTION INTRAMUSCULAR; INTRAVENOUS
Status: DISCONTINUED | OUTPATIENT
Start: 2025-04-18 | End: 2025-04-18

## 2025-04-18 RX ORDER — FENTANYL CITRATE 50 UG/ML
25 INJECTION, SOLUTION INTRAMUSCULAR; INTRAVENOUS EVERY 5 MIN PRN
Status: COMPLETED | OUTPATIENT
Start: 2025-04-18 | End: 2025-04-18

## 2025-04-18 RX ORDER — PROPOFOL 10 MG/ML
VIAL (ML) INTRAVENOUS
Status: DISCONTINUED | OUTPATIENT
Start: 2025-04-18 | End: 2025-04-18

## 2025-04-18 RX ORDER — ONDANSETRON HYDROCHLORIDE 2 MG/ML
INJECTION, SOLUTION INTRAVENOUS
Status: DISCONTINUED | OUTPATIENT
Start: 2025-04-18 | End: 2025-04-18

## 2025-04-18 RX ORDER — DEXAMETHASONE SODIUM PHOSPHATE 4 MG/ML
INJECTION, SOLUTION INTRA-ARTICULAR; INTRALESIONAL; INTRAMUSCULAR; INTRAVENOUS; SOFT TISSUE
Status: DISCONTINUED | OUTPATIENT
Start: 2025-04-18 | End: 2025-04-18

## 2025-04-18 RX ORDER — MUPIROCIN 20 MG/G
OINTMENT TOPICAL
Status: DISCONTINUED | OUTPATIENT
Start: 2025-04-18 | End: 2025-04-18 | Stop reason: HOSPADM

## 2025-04-18 RX ORDER — HYDROMORPHONE HYDROCHLORIDE 2 MG/ML
0.2 INJECTION, SOLUTION INTRAMUSCULAR; INTRAVENOUS; SUBCUTANEOUS EVERY 5 MIN PRN
Status: DISCONTINUED | OUTPATIENT
Start: 2025-04-18 | End: 2025-04-18 | Stop reason: HOSPADM

## 2025-04-18 RX ORDER — GLUCAGON 1 MG
1 KIT INJECTION
Status: DISCONTINUED | OUTPATIENT
Start: 2025-04-18 | End: 2025-04-18 | Stop reason: HOSPADM

## 2025-04-18 RX ORDER — FENTANYL CITRATE 50 UG/ML
INJECTION, SOLUTION INTRAMUSCULAR; INTRAVENOUS
Status: DISCONTINUED | OUTPATIENT
Start: 2025-04-18 | End: 2025-04-18

## 2025-04-18 RX ORDER — SODIUM CHLORIDE, SODIUM GLUCONATE, SODIUM ACETATE, POTASSIUM CHLORIDE AND MAGNESIUM CHLORIDE 30; 37; 368; 526; 502 MG/100ML; MG/100ML; MG/100ML; MG/100ML; MG/100ML
INJECTION, SOLUTION INTRAVENOUS CONTINUOUS PRN
Status: DISCONTINUED | OUTPATIENT
Start: 2025-04-18 | End: 2025-04-18

## 2025-04-18 RX ORDER — OXYCODONE HYDROCHLORIDE 5 MG/1
5 TABLET ORAL
Status: DISCONTINUED | OUTPATIENT
Start: 2025-04-18 | End: 2025-04-18 | Stop reason: HOSPADM

## 2025-04-18 RX ADMIN — GLYCOPYRROLATE 0.2 MG: 0.2 INJECTION, SOLUTION INTRAMUSCULAR; INTRAVITREAL at 09:04

## 2025-04-18 RX ADMIN — HYDROCODONE BITARTRATE AND ACETAMINOPHEN 1 TABLET: 10; 325 TABLET ORAL at 12:04

## 2025-04-18 RX ADMIN — PROPOFOL 200 MG: 10 INJECTION, EMULSION INTRAVENOUS at 09:04

## 2025-04-18 RX ADMIN — Medication 100 MCG: at 09:04

## 2025-04-18 RX ADMIN — FENTANYL CITRATE 50 MCG: 50 INJECTION, SOLUTION INTRAMUSCULAR; INTRAVENOUS at 09:04

## 2025-04-18 RX ADMIN — VANCOMYCIN HYDROCHLORIDE 1250 MG: 1.25 INJECTION, POWDER, LYOPHILIZED, FOR SOLUTION INTRAVENOUS at 08:04

## 2025-04-18 RX ADMIN — DEXAMETHASONE SODIUM PHOSPHATE 4 MG: 4 INJECTION, SOLUTION INTRA-ARTICULAR; INTRALESIONAL; INTRAMUSCULAR; INTRAVENOUS; SOFT TISSUE at 09:04

## 2025-04-18 RX ADMIN — FENTANYL CITRATE 25 MCG: 50 INJECTION, SOLUTION INTRAMUSCULAR; INTRAVENOUS at 11:04

## 2025-04-18 RX ADMIN — AMLODIPINE BESYLATE 10 MG: 5 TABLET ORAL at 08:04

## 2025-04-18 RX ADMIN — OXYCODONE 5 MG: 5 TABLET ORAL at 10:04

## 2025-04-18 RX ADMIN — SODIUM CHLORIDE 1000 MG: 9 INJECTION, SOLUTION INTRAVENOUS at 07:04

## 2025-04-18 RX ADMIN — MIDAZOLAM HYDROCHLORIDE 2 MG: 1 INJECTION, SOLUTION INTRAMUSCULAR; INTRAVENOUS at 09:04

## 2025-04-18 RX ADMIN — HYDROCODONE BITARTRATE AND ACETAMINOPHEN 1 TABLET: 10; 325 TABLET ORAL at 07:04

## 2025-04-18 RX ADMIN — LIDOCAINE HYDROCHLORIDE 100 MG: 20 INJECTION, SOLUTION EPIDURAL; INFILTRATION; INTRACAUDAL at 09:04

## 2025-04-18 RX ADMIN — ACETAMINOPHEN 650 MG: 325 TABLET ORAL at 09:04

## 2025-04-18 RX ADMIN — HYDROMORPHONE HYDROCHLORIDE 0.2 MG: 2 INJECTION INTRAMUSCULAR; INTRAVENOUS; SUBCUTANEOUS at 11:04

## 2025-04-18 RX ADMIN — HYDRALAZINE HYDROCHLORIDE 25 MG: 25 TABLET ORAL at 12:04

## 2025-04-18 RX ADMIN — ONDANSETRON 4 MG: 2 INJECTION INTRAMUSCULAR; INTRAVENOUS at 09:04

## 2025-04-18 RX ADMIN — MUPIROCIN 1 G: 20 OINTMENT TOPICAL at 08:04

## 2025-04-18 RX ADMIN — SODIUM CHLORIDE, SODIUM GLUCONATE, SODIUM ACETATE, POTASSIUM CHLORIDE, MAGNESIUM CHLORIDE, SODIUM PHOSPHATE, DIBASIC, AND POTASSIUM PHOSPHATE: .53; .5; .37; .037; .03; .012; .00082 INJECTION, SOLUTION INTRAVENOUS at 09:04

## 2025-04-18 RX ADMIN — SENNOSIDES AND DOCUSATE SODIUM 1 TABLET: 50; 8.6 TABLET ORAL at 08:04

## 2025-04-18 RX ADMIN — Medication 200 MCG: at 09:04

## 2025-04-18 RX ADMIN — HYDROCODONE BITARTRATE AND ACETAMINOPHEN 1 TABLET: 5; 325 TABLET ORAL at 12:04

## 2025-04-18 RX ADMIN — CEFAZOLIN 2 G: 330 INJECTION, POWDER, FOR SOLUTION INTRAMUSCULAR; INTRAVENOUS at 10:04

## 2025-04-18 NOTE — SUBJECTIVE & OBJECTIVE
Interval History: Patient is scheduled for surgery today. She is afebrile. Pain is controlled.     Review of Systems  Objective:     Vital Signs (Most Recent):  Temp: 98 °F (36.7 °C) (04/18/25 1158)  Pulse: 89 (04/18/25 1250)  Resp: 18 (04/18/25 1250)  BP: (!) 143/63 (04/18/25 1250)  SpO2: 96 % (04/18/25 1250) Vital Signs (24h Range):  Temp:  [97.9 °F (36.6 °C)-99.2 °F (37.3 °C)] 98 °F (36.7 °C)  Pulse:  [] 89  Resp:  [15-20] 18  SpO2:  [92 %-98 %] 96 %  BP: (130-197)/(63-97) 143/63     Weight: 74.9 kg (165 lb 2 oz)  Body mass index is 30.2 kg/m².    Intake/Output Summary (Last 24 hours) at 4/18/2025 1300  Last data filed at 4/18/2025 1158  Gross per 24 hour   Intake 1730 ml   Output 455 ml   Net 1275 ml         Physical Exam  Vitals and nursing note reviewed.   Constitutional:       General: She is not in acute distress.     Appearance: She is not toxic-appearing.   HENT:      Head: Atraumatic.      Mouth/Throat:      Mouth: Mucous membranes are moist.      Pharynx: Oropharynx is clear.   Eyes:      General: No scleral icterus.     Conjunctiva/sclera: Conjunctivae normal.      Pupils: Pupils are equal, round, and reactive to light.   Cardiovascular:      Rate and Rhythm: Normal rate and regular rhythm.      Heart sounds: No murmur heard.  Pulmonary:      Effort: No respiratory distress.      Breath sounds: No wheezing, rhonchi or rales.   Abdominal:      General: Abdomen is flat. Bowel sounds are normal.      Palpations: Abdomen is soft.   Musculoskeletal:         General: No swelling or deformity.      Cervical back: No rigidity or tenderness.      Comments: Left hand is swollen with pus draining from the incision. Swelling and redness improved compared to the picture on admission.    Skin:     Coloration: Skin is not jaundiced or pale.      Findings: No bruising, erythema or rash.   Neurological:      General: No focal deficit present.      Mental Status: She is alert and oriented to person, place, and  time.      Cranial Nerves: No cranial nerve deficit.      Sensory: No sensory deficit.      Motor: No weakness.   Psychiatric:         Mood and Affect: Mood normal.         Behavior: Behavior normal.       Significant Labs: All pertinent labs within the past 24 hours have been reviewed.  CBC:   Recent Labs   Lab 04/17/25 0544 04/18/25 0349   WBC 6.72 7.77   HGB 11.4* 10.8*   HCT 35.3* 34.3*   * 488*     CMP:   Recent Labs   Lab 04/17/25 0544 04/18/25 0349   * 136   K 4.0 3.8   CO2 21* 21*   BUN 11 12   CREATININE 1.0 0.8   CALCIUM 8.4* 8.3*   ALBUMIN 3.2* 2.9*   BILITOT 0.2 0.1   ALKPHOS 28* 27*   AST 22 27   ALT 14 13       Significant Imaging: I have reviewed all pertinent imaging results/findings within the past 24 hours.

## 2025-04-18 NOTE — ANESTHESIA POSTPROCEDURE EVALUATION
Anesthesia Post Evaluation    Patient: Karen Gomez    Procedure(s) Performed: Procedure(s) (LRB):  INCISION AND DRAINAGE, HAND (Left)    Final Anesthesia Type: general      Patient location during evaluation: PACU  Patient participation: Yes- Able to Participate  Level of consciousness: awake and alert  Post-procedure vital signs: reviewed and stable  Pain management: adequate  Airway patency: patent    PONV status at discharge: No PONV  Anesthetic complications: no      Cardiovascular status: blood pressure returned to baseline  Respiratory status: unassisted and room air  Hydration status: euvolemic  Follow-up not needed.              Vitals Value Taken Time   /63 04/18/25 15:09   Temp 36.7 °C (98 °F) 04/18/25 15:09   Pulse 91 04/18/25 15:09   Resp 16 04/18/25 15:09   SpO2 95 % 04/18/25 15:09         Event Time   Out of Recovery 04/18/2025 11:45:00         Pain/Jayson Score: Pain Rating Prior to Med Admin: 6 (4/18/2025 12:03 PM)  Pain Rating Post Med Admin: 3 (4/18/2025  1:03 PM)  Jayson Score: 8 (4/18/2025 11:45 AM)

## 2025-04-18 NOTE — PLAN OF CARE
"Released from Pacu per Anesthesia when criteria met pain controlled skin w+d No nausea No emesis dsg dry intact sleeping easily  contact precautions maintained since report from floor rn was Jose "sleepy but ox4 encouraged deep breaths instr on IS encouraged use  Pt has all belongings with mom at bedside ice to L hand elevation maintained ace wrap intact cap refill  < 3 brachial pulse +2   cultures were sent from OR per Md  02 at 2 liters nc to keep sats >92 since sleepy   "

## 2025-04-18 NOTE — ASSESSMENT & PLAN NOTE
S/p I&D in the ED followed by orthopedic on 4/18  Abscess fluid is growing MRSA  Did not have joint or bone involvement per op note   Wound is packed   - DC Rocephin and Cipro   - Cont Vancomycin

## 2025-04-18 NOTE — PLAN OF CARE
POC/Meds reviewed, pt verbalized understanding. Vitals stable.  Afebrile. Prn medications given. Npo after midnight. CHG wipes given this am. Up with SB assist. Repositions self. Hourly/Q2hr rounding performed, safety maintained. Bed in lowest position, wheels locked, SR up x2, call light in easy reach. No  complaints at this time. Will continue to monitor.

## 2025-04-18 NOTE — PROGRESS NOTES
"[facility] Medicine  Progress Note    Patient Name: Karen Gomez  MRN: [id number]  Patient Class: IP- Inpatient   Admission Date: 4/16/2025  Length of Stay: 0 days  Attending Physician: Francis Garcia MD  Primary Care Provider: Negra, Primary Doctor        Subjective     Principal Problem:Abscess of left hand        HPI:  Karen Gomez is a 49 y/o F with a past medical history of HTN, not on chronic antihypertensives, who presented to the ED with abscess to the left hand.  She states that she had "a spider bite" to that location that she noticed about a week ago.  At that time she was in [place] and there was salt water exposure to the wound.  She reports that night she noticed redness and some swelling to the left hand.  3 days later she was seen in the ED for this problem.  Hospital admission was recommended at that time; however, patient declined.  She was placed on oral bactrim and oral cipro with which she reports compliance; however, erythema and swelling have continued and an obvious abscess has formed.  I and D performed in the ED with moderate purulent drainage, cultures obtained.  Hospital medicine to admit for continued medical management.       Overview/Hospital Course:  Patient is a 50 year old female who was admitted on right hand abscess after a spider bite. She has a history of HTN. Patient had bedside I&D by the ED provider and was started on Rocephin and Vancomycin. Orthopedics and wound care consulted. Patient underwent I&D by orthopedics on 4/18. Abscess fluid is growing MRSA. Rocephin discontinued and Vancomycin continued.     Interval History: Patient is scheduled for surgery today. She is afebrile. Pain is controlled.     Review of Systems  Objective:     Vital Signs (Most Recent):  Temp: 98 °F (36.7 °C) (04/18/25 1158)  Pulse: 89 (04/18/25 1250)  Resp: 18 (04/18/25 1250)  BP: (!) 143/63 (04/18/25 1250)  SpO2: 96 % (04/18/25 1250) Vital Signs (24h " Range):  Temp:  [97.9 °F (36.6 °C)-99.2 °F (37.3 °C)] 98 °F (36.7 °C)  Pulse:  [] 89  Resp:  [15-20] 18  SpO2:  [92 %-98 %] 96 %  BP: (130-197)/(63-97) 143/63     Weight: 74.9 kg (165 lb 2 oz)  Body mass index is 30.2 kg/m².    Intake/Output Summary (Last 24 hours) at 4/18/2025 1300  Last data filed at 4/18/2025 1158  Gross per 24 hour   Intake 1730 ml   Output 455 ml   Net 1275 ml         Physical Exam  Vitals and nursing note reviewed.   Constitutional:       General: She is not in acute distress.     Appearance: She is not toxic-appearing.   HENT:      Head: Atraumatic.      Mouth/Throat:      Mouth: Mucous membranes are moist.      Pharynx: Oropharynx is clear.   Eyes:      General: No scleral icterus.     Conjunctiva/sclera: Conjunctivae normal.      Pupils: Pupils are equal, round, and reactive to light.   Cardiovascular:      Rate and Rhythm: Normal rate and regular rhythm.      Heart sounds: No murmur heard.  Pulmonary:      Effort: No respiratory distress.      Breath sounds: No wheezing, rhonchi or rales.   Abdominal:      General: Abdomen is flat. Bowel sounds are normal.      Palpations: Abdomen is soft.   Musculoskeletal:         General: No swelling or deformity.      Cervical back: No rigidity or tenderness.      Comments: Left hand is swollen with pus draining from the incision. Swelling and redness improved compared to the picture on admission.    Skin:     Coloration: Skin is not jaundiced or pale.      Findings: No bruising, erythema or rash.   Neurological:      General: No focal deficit present.      Mental Status: She is alert and oriented to person, place, and time.      Cranial Nerves: No cranial nerve deficit.      Sensory: No sensory deficit.      Motor: No weakness.   Psychiatric:         Mood and Affect: Mood normal.         Behavior: Behavior normal.       Significant Labs: All pertinent labs within the past 24 hours have been reviewed.  CBC:   Recent Labs   Lab 04/17/25  5712  04/18/25  0349   WBC 6.72 7.77   HGB 11.4* 10.8*   HCT 35.3* 34.3*   * 488*     CMP:   Recent Labs   Lab 04/17/25  0544 04/18/25 0349   * 136   K 4.0 3.8   CO2 21* 21*   BUN 11 12   CREATININE 1.0 0.8   CALCIUM 8.4* 8.3*   ALBUMIN 3.2* 2.9*   BILITOT 0.2 0.1   ALKPHOS 28* 27*   AST 22 27   ALT 14 13       Significant Imaging: I have reviewed all pertinent imaging results/findings within the past 24 hours.      Assessment & Plan  Abscess of left hand  S/p I&D in the ED followed by orthopedic on 4/18  Abscess fluid is growing MRSA  Did not have joint or bone involvement per op note   Wound is packed   - DC Rocephin and Cipro   - Cont Vancomycin     Primary hypertension  Patient's blood pressure range in the last 24 hours was: BP  Min: 130/80  Max: 197/87.The patient's inpatient anti-hypertensive regimen is listed below:  Current Antihypertensives  hydrALAZINE tablet 25 mg, Every 8 hours PRN, Oral  amLODIPine tablet 10 mg, Daily, Oral    Plan  - BP is uncontrolled, will adjust as follows: prn hydralazine  - needs OP f/u with PCP  Nicotine dependence  Dangers of vaping with nicotine were reviewed with patient in detail. Patient was Counseled for 3-10 minutes. Nicotine replacement options were discussed. Nicotine replacement was discussed- not prescribed per patient's request  Sepsis  See hand abscess above   VTE Risk Mitigation (From admission, onward)           Ordered     IP VTE HIGH RISK PATIENT  Once         04/16/25 0543     Place sequential compression device  Until discontinued         04/16/25 0543     Reason for No Pharmacological VTE Prophylaxis  Once        Question:  Reasons:  Answer:  Patient is Ambulatory    04/16/25 0543                    Discharge Planning   HAIDER: 4/20/2025     Code Status: Full Code   Medical Readiness for Discharge Date:   Discharge Plan A: Home with family                        Francis Garcia MD  Department of Hospital Medicine   Formerly [facility]  Med/Surg

## 2025-04-18 NOTE — PLAN OF CARE
Plan of care reviewed with patient. Patient verbalized complete understanding. Two hour patient rounding maintained throughout shift. All fall precautions maintained. Bed in lowest position, locked, call light in reach. Side rails up x 2. Slip resistant socks maintained. Needs attended to.     Problem: Adult Inpatient Plan of Care  Goal: Plan of Care Review  Outcome: Progressing  Goal: Patient-Specific Goal (Individualized)  Outcome: Progressing  Goal: Absence of Hospital-Acquired Illness or Injury  Outcome: Progressing  Goal: Optimal Comfort and Wellbeing  Outcome: Progressing  Goal: Readiness for Transition of Care  Outcome: Progressing     Problem: Sepsis/Septic Shock  Goal: Optimal Coping  Outcome: Progressing  Goal: Absence of Bleeding  Outcome: Progressing  Goal: Blood Glucose Level Within Targeted Range  Outcome: Progressing  Goal: Absence of Infection Signs and Symptoms  Outcome: Progressing  Goal: Optimal Nutrition Intake  Outcome: Progressing     Problem: Skin Injury Risk Increased  Goal: Skin Health and Integrity  Outcome: Progressing     Problem: Wound  Goal: Optimal Coping  Outcome: Progressing  Goal: Optimal Functional Ability  Outcome: Progressing  Goal: Absence of Infection Signs and Symptoms  Outcome: Progressing  Goal: Improved Oral Intake  Outcome: Progressing  Goal: Optimal Pain Control and Function  Outcome: Progressing  Goal: Skin Health and Integrity  Outcome: Progressing  Goal: Optimal Wound Healing  Outcome: Progressing     Problem: Infection  Goal: Absence of Infection Signs and Symptoms  Outcome: Progressing

## 2025-04-18 NOTE — CARE UPDATE
04/18/25 0805   Patient Assessment/Suction   Level of Consciousness (AVPU) alert   Respiratory Effort Unlabored   Expansion/Accessory Muscles/Retractions no use of accessory muscles;no retractions;expansion symmetric   Rhythm/Pattern, Respiratory unlabored;pattern regular;depth regular;no shortness of breath reported   PRE-TX-O2   Device (Oxygen Therapy) room air   SpO2 95 %   Pulse Oximetry Type Intermittent   $ Pulse Oximetry - Multiple Charge Pulse Oximetry - Multiple   Pulse 70   Resp 16   Aerosol Therapy   $ Aerosol Therapy Charges PRN treatment not required   Respiratory Treatment Status (SVN) PRN treatment not required

## 2025-04-18 NOTE — BRIEF OP NOTE
Former[facility]  Brief Operative Note    SUMMARY     Surgery Date: 4/18/2025     Surgeons and Role:     * Will Bryant MD - Primary    Assisting Surgeon: None    Pre-op Diagnosis:  Abscess of left hand [L02.512]    Post-op Diagnosis:  Post-Op Diagnosis Codes:     * Abscess of left hand [L02.512]    Procedure(s) (LRB):  INCISION AND DRAINAGE, HAND (Left)    Anesthesia: General    Implants:  * No implants in log *    Operative Findings:  Dorsal ulnar abscess, necrosis of skin subcutaneous tissue and muscle    Estimated Blood Loss: * No values recorded between 4/18/2025  9:14 AM and 4/18/2025 10:22 AM *    Estimated Blood Loss has been documented.         Specimens:   Specimen (24h ago, onward)      None          * No specimens in log *    [id number]

## 2025-04-18 NOTE — ASSESSMENT & PLAN NOTE
Dangers of vaping with nicotine were reviewed with patient in detail. Patient was Counseled for 3-10 minutes. Nicotine replacement options were discussed. Nicotine replacement was discussed- not prescribed per patient's request   I have bloody urine

## 2025-04-18 NOTE — ASSESSMENT & PLAN NOTE
Patient's blood pressure range in the last 24 hours was: BP  Min: 130/80  Max: 197/87.The patient's inpatient anti-hypertensive regimen is listed below:  Current Antihypertensives  hydrALAZINE tablet 25 mg, Every 8 hours PRN, Oral  amLODIPine tablet 10 mg, Daily, Oral    Plan  - BP is uncontrolled, will adjust as follows: prn hydralazine  - needs OP f/u with PCP

## 2025-04-18 NOTE — TRANSFER OF CARE
"Anesthesia Transfer of Care Note    Patient: Karen Gomez    Procedure(s) Performed: Procedure(s) (LRB):  INCISION AND DRAINAGE, HAND (Left)    Patient location: PACU    Anesthesia Type: general    Transport from OR: Transported from OR on 2-3 L/min O2 by NC with adequate spontaneous ventilation    Post pain: adequate analgesia    Post assessment: no apparent anesthetic complications and tolerated procedure well    Post vital signs: stable    Level of consciousness: awake and alert    Nausea/Vomiting: no nausea/vomiting    Complications: none    Transfer of care protocol was followed      Last vitals: Visit Vitals  BP (!) 194/84   Pulse 82   Temp 36.6 °C (97.9 °F)   Resp 17   Ht 5' 2" (1.575 m)   Wt 74.9 kg (165 lb 2 oz)   LMP 04/12/2025   SpO2 96%   Breastfeeding No   BMI 30.20 kg/m²     "

## 2025-04-18 NOTE — PLAN OF CARE
Problem: Adult Inpatient Plan of Care  Goal: Plan of Care Review  Outcome: Progressing  Goal: Patient-Specific Goal (Individualized)  Outcome: Progressing  Goal: Absence of Hospital-Acquired Illness or Injury  Outcome: Progressing  Goal: Optimal Comfort and Wellbeing  Outcome: Progressing  Goal: Readiness for Transition of Care  Outcome: Progressing     Problem: Sepsis/Septic Shock  Goal: Optimal Coping  Outcome: Progressing  Goal: Absence of Bleeding  Outcome: Progressing  Goal: Blood Glucose Level Within Targeted Range  Outcome: Progressing  Goal: Absence of Infection Signs and Symptoms  Outcome: Progressing  Goal: Optimal Nutrition Intake  Outcome: Progressing     Problem: Skin Injury Risk Increased  Goal: Skin Health and Integrity  Outcome: Progressing     Problem: Wound  Goal: Optimal Coping  Outcome: Progressing  Goal: Optimal Functional Ability  Outcome: Progressing  Goal: Absence of Infection Signs and Symptoms  Outcome: Progressing  Goal: Improved Oral Intake  Outcome: Progressing  Goal: Optimal Pain Control and Function  Outcome: Progressing  Goal: Skin Health and Integrity  Outcome: Progressing  Goal: Optimal Wound Healing  Outcome: Progressing     Problem: Infection  Goal: Absence of Infection Signs and Symptoms  Outcome: Progressing   L hand wound with kerlix D/I-- changed per wound care team. New R upper arm midline intact. Maribel diet--NPO after MN for surgery in AM. Maribel [place] 10mg for pain. Voiding well per BR. Up ad lizbet. Will monitor. Pleasant.

## 2025-04-18 NOTE — ANESTHESIA PREPROCEDURE EVALUATION
04/18/2025  Karen Gomez is a 50 y.o., female.      Pre-op Assessment          Review of Systems  Cardiovascular:     Hypertension                                    Hypertension         Psych:  Psychiatric History                  Physical Exam  General: Well nourished        Anesthesia Plan  Type of Anesthesia, risks & benefits discussed:    Anesthesia Type: Gen Supraglottic Airway  Intra-op Monitoring Plan: Standard ASA Monitors  Post Op Pain Control Plan: multimodal analgesia and IV/PO Opioids PRN  Induction:  IV  Informed Consent: Informed consent signed with the Patient and all parties understand the risks and agree with anesthesia plan.  All questions answered.   ASA Score: 2  Day of Surgery Review of History & Physical: H&P Update referred to the surgeon/provider.    Ready For Surgery From Anesthesia Perspective.     .

## 2025-04-18 NOTE — OP NOTE
[facility]  Surgery Department  Operative Note    SUMMARY     Date of Procedure: 4/18/2025     Procedure: Procedure(s) (LRB):  INCISION AND DRAINAGE, HAND (Left)     Surgeons and Role:     * Will Bryant MD - Primary    Assisting Surgeon: None    Pre-Operative Diagnosis: Abscess of left hand [L02.512]    Post-Operative Diagnosis: Post-Op Diagnosis Codes:     * Abscess of left hand [L02.512]    Anesthesia: General    Operative Findings (including complications, if any):  Serous sanguinous fluid, small pocket of purulence.  Necrosis of the skin subcutaneous tissue and fat without obvious involvement with bone    Description of Technical Procedures:     This is a 50-year-old female who presented to the emergency department yesterday with left hand abscess.  She was started on IV antibiotics.  She had failed oral antibiotic therapy for a dorsal hand abscess and we elected to proceed to the operating room for formal irrigation and debridement.  We discussed the risks and benefits of the surgery in detail and she would like to proceed.    On the day of surgery she was seen in the preoperative area where consents were verified the surgical site was marked.  She was taken to the surgical suite and placed on the stretcher with an armboard.  The left upper extremity was then prepped and draped in a sterile fashion.  Tourniquet was applied non sterilely to the left upper arm and was inflated during the case for 20 minutes.  We then prepped and draped the left upper extremity.  A time-out was performed verifying the procedure and laterality, all agreed and we elected to proceed.  She had a abscess located on the dorsal ulnar aspect of the hand in the webspace between the 4th and 5th digits.  This was opened longitudinally.  She had a previous I and D in the emergency department.  This was extended proximally.  There was scant purulence that was noted and we did culture this for aerobic  anaerobic and fungal culture.  She had necrosis and maceration of the skin as well as the subcutaneous tissue and fat layer.  We used a hemostat to open up any spaces deep within the webspace.  This seemed to be contained to the tissue and did not involve any bone or tendon.  We then spent some time debriding the skin edges sharply with a knife as well as the subcutaneous tissue and fat to bleeding tissue.  The total area of debridement was 3 x 2 cm.  We used a curette and rongeur to remove any nonviable tissue.  The skin mobilized well and we were able to primarily close using loose interrupted nylon.  We did also pack with iodoform packing with plans for removal in 2 days.      Significant Surgical Tasks Conducted by the Assistant(s), if Applicable: Dione ZAYAS    She was present and scrubbed for the entirety of the procedure and instrumental in patient positioning, prepping and draping, retraction and closure/dressing application. Without her I would have been unable to safely perform the case due to only one scrub tech available.       Estimated Blood Loss (EBL): * No values recorded between 4/18/2025  9:14 AM and 4/18/2025 10:22 AM *           Implants: * No implants in log *    Specimens:   Specimen (24h ago, onward)      None           * No specimens in log *           Condition: Good    Disposition: PACU - hemodynamically stable.    Attestation: Op Note Attestation: I was physically present and scrubbed for the entire procedure.

## 2025-04-18 NOTE — ANESTHESIA PROCEDURE NOTES
Intubation    Date/Time: 4/18/2025 9:31 AM    Performed by: Gabriella Greco CRNA  Authorized by: Ammy Fowler MD    Intubation:     Induction:  Intravenous    Intubated:  Postinduction    Mask Ventilation:  Easy mask    Attempts:  1    Attempted By:  CRNA    Difficult Airway Encountered?: No      Complications:  None    Airway Device:  Supraglottic airway/LMA    Airway Device Size:  3.0    Style/Cuff Inflation:  Cuffed (inflated to minimal occlusive pressure)    Secured at:  The lips    Placement Verified By:  Capnometry    Complicating Factors:  None    Findings Post-Intubation:  BS equal bilateral and atraumatic/condition of teeth unchanged

## 2025-04-19 VITALS
RESPIRATION RATE: 18 BRPM | WEIGHT: 165.13 LBS | TEMPERATURE: 99 F | BODY MASS INDEX: 30.39 KG/M2 | OXYGEN SATURATION: 95 % | DIASTOLIC BLOOD PRESSURE: 69 MMHG | HEIGHT: 62 IN | SYSTOLIC BLOOD PRESSURE: 140 MMHG | HEART RATE: 86 BPM

## 2025-04-19 PROBLEM — A41.9 SEPSIS: Status: RESOLVED | Noted: 2025-04-16 | Resolved: 2025-04-19

## 2025-04-19 PROBLEM — D64.9 ANEMIA: Status: ACTIVE | Noted: 2025-04-19

## 2025-04-19 PROBLEM — E66.9 OBESITY (BMI 30-39.9): Status: ACTIVE | Noted: 2025-04-19

## 2025-04-19 LAB
ABSOLUTE EOSINOPHIL (SMH): 0.09 K/UL
ABSOLUTE MONOCYTE (SMH): 0.89 K/UL (ref 0.3–1)
ABSOLUTE NEUTROPHIL COUNT (SMH): 9.1 K/UL (ref 1.8–7.7)
ALBUMIN SERPL-MCNC: 3 G/DL (ref 3.5–5.2)
ALP SERPL-CCNC: 25 UNIT/L (ref 40–150)
ALT SERPL-CCNC: 9 UNIT/L (ref 10–44)
ANION GAP (SMH): 9 MMOL/L (ref 8–16)
AST SERPL-CCNC: 21 UNIT/L (ref 11–45)
BASOPHILS # BLD AUTO: 0.06 K/UL
BASOPHILS NFR BLD AUTO: 0.5 %
BILIRUB SERPL-MCNC: 0.1 MG/DL (ref 0.1–1)
BUN SERPL-MCNC: 14 MG/DL (ref 6–20)
CALCIUM SERPL-MCNC: 8.5 MG/DL (ref 8.7–10.5)
CHLORIDE SERPL-SCNC: 106 MMOL/L (ref 95–110)
CO2 SERPL-SCNC: 23 MMOL/L (ref 23–29)
CREAT SERPL-MCNC: 0.8 MG/DL (ref 0.5–1.4)
CRP SERPL-MCNC: 18.1 MG/L
ERYTHROCYTE [DISTWIDTH] IN BLOOD BY AUTOMATED COUNT: 14.6 % (ref 11.5–14.5)
GFR SERPLBLD CREATININE-BSD FMLA CKD-EPI: >60 ML/MIN/1.73/M2
GLUCOSE SERPL-MCNC: 116 MG/DL (ref 70–110)
HCT VFR BLD AUTO: 33.5 % (ref 37–48.5)
HGB BLD-MCNC: 10.7 GM/DL (ref 12–16)
HOLD SPECIMEN: NORMAL
IMM GRANULOCYTES # BLD AUTO: 0.05 K/UL (ref 0–0.04)
IMM GRANULOCYTES NFR BLD AUTO: 0.4 % (ref 0–0.5)
LYMPHOCYTES # BLD AUTO: 3.08 K/UL (ref 1–4.8)
MAGNESIUM SERPL-MCNC: 2 MG/DL (ref 1.6–2.6)
MCH RBC QN AUTO: 26.2 PG (ref 27–31)
MCHC RBC AUTO-ENTMCNC: 31.9 G/DL (ref 32–36)
MCV RBC AUTO: 82 FL (ref 82–98)
NUCLEATED RBC (/100WBC) (SMH): 0 /100 WBC
PHOSPHATE SERPL-MCNC: 3.7 MG/DL (ref 2.7–4.5)
PLATELET # BLD AUTO: 502 K/UL (ref 150–450)
PMV BLD AUTO: 8.6 FL (ref 9.2–12.9)
POTASSIUM SERPL-SCNC: 3.7 MMOL/L (ref 3.5–5.1)
PROT SERPL-MCNC: 6.2 GM/DL (ref 6–8.4)
RBC # BLD AUTO: 4.08 M/UL (ref 4–5.4)
RELATIVE EOSINOPHIL (SMH): 0.7 % (ref 0–8)
RELATIVE LYMPHOCYTE (SMH): 23.2 % (ref 18–48)
RELATIVE MONOCYTE (SMH): 6.7 % (ref 4–15)
RELATIVE NEUTROPHIL (SMH): 68.5 % (ref 38–73)
SODIUM SERPL-SCNC: 138 MMOL/L (ref 136–145)
WBC # BLD AUTO: 13.25 K/UL (ref 3.9–12.7)

## 2025-04-19 PROCEDURE — 25000003 PHARM REV CODE 250: Performed by: ORTHOPAEDIC SURGERY

## 2025-04-19 PROCEDURE — 36415 COLL VENOUS BLD VENIPUNCTURE: CPT | Performed by: ORTHOPAEDIC SURGERY

## 2025-04-19 PROCEDURE — 94761 N-INVAS EAR/PLS OXIMETRY MLT: CPT

## 2025-04-19 PROCEDURE — 86140 C-REACTIVE PROTEIN: CPT | Performed by: STUDENT IN AN ORGANIZED HEALTH CARE EDUCATION/TRAINING PROGRAM

## 2025-04-19 PROCEDURE — 84100 ASSAY OF PHOSPHORUS: CPT | Performed by: ORTHOPAEDIC SURGERY

## 2025-04-19 PROCEDURE — 83735 ASSAY OF MAGNESIUM: CPT | Performed by: ORTHOPAEDIC SURGERY

## 2025-04-19 PROCEDURE — 85025 COMPLETE CBC W/AUTO DIFF WBC: CPT | Performed by: ORTHOPAEDIC SURGERY

## 2025-04-19 PROCEDURE — 80053 COMPREHEN METABOLIC PANEL: CPT | Performed by: ORTHOPAEDIC SURGERY

## 2025-04-19 PROCEDURE — 25000003 PHARM REV CODE 250: Performed by: NURSE PRACTITIONER

## 2025-04-19 PROCEDURE — 63600175 PHARM REV CODE 636 W HCPCS: Performed by: NURSE PRACTITIONER

## 2025-04-19 PROCEDURE — 99900035 HC TECH TIME PER 15 MIN (STAT)

## 2025-04-19 RX ORDER — AMLODIPINE BESYLATE 10 MG/1
10 TABLET ORAL DAILY
Qty: 90 TABLET | Refills: 3 | Status: SHIPPED | OUTPATIENT
Start: 2025-04-20 | End: 2026-04-20

## 2025-04-19 RX ORDER — CLINDAMYCIN HYDROCHLORIDE 300 MG/1
300 CAPSULE ORAL EVERY 6 HOURS
Qty: 40 CAPSULE | Refills: 0 | Status: SHIPPED | OUTPATIENT
Start: 2025-04-19 | End: 2025-04-29

## 2025-04-19 RX ORDER — HYDROCODONE BITARTRATE AND ACETAMINOPHEN 5; 325 MG/1; MG/1
1 TABLET ORAL EVERY 6 HOURS PRN
Qty: 12 TABLET | Refills: 0 | Status: SHIPPED | OUTPATIENT
Start: 2025-04-19 | End: 2025-04-23

## 2025-04-19 RX ORDER — ENOXAPARIN SODIUM 100 MG/ML
40 INJECTION SUBCUTANEOUS EVERY 24 HOURS
Status: DISCONTINUED | OUTPATIENT
Start: 2025-04-19 | End: 2025-04-19 | Stop reason: HOSPADM

## 2025-04-19 RX ADMIN — AMLODIPINE BESYLATE 10 MG: 5 TABLET ORAL at 09:04

## 2025-04-19 RX ADMIN — SENNOSIDES AND DOCUSATE SODIUM 1 TABLET: 50; 8.6 TABLET ORAL at 09:04

## 2025-04-19 RX ADMIN — HYDROCODONE BITARTRATE AND ACETAMINOPHEN 1 TABLET: 10; 325 TABLET ORAL at 09:04

## 2025-04-19 RX ADMIN — VANCOMYCIN HYDROCHLORIDE 1250 MG: 1.25 INJECTION, POWDER, LYOPHILIZED, FOR SOLUTION INTRAVENOUS at 09:04

## 2025-04-19 RX ADMIN — MUPIROCIN 1 G: 20 OINTMENT TOPICAL at 09:04

## 2025-04-19 RX ADMIN — POTASSIUM BICARBONATE 50 MEQ: 977.5 TABLET, EFFERVESCENT ORAL at 09:04

## 2025-04-19 NOTE — PLAN OF CARE
Problem: Adult Inpatient Plan of Care  Goal: Plan of Care Review  Outcome: Met  Goal: Patient-Specific Goal (Individualized)  Outcome: Met  Goal: Absence of Hospital-Acquired Illness or Injury  Outcome: Met  Goal: Optimal Comfort and Wellbeing  Outcome: Met  Goal: Readiness for Transition of Care  Outcome: Met     Problem: Sepsis/Septic Shock  Goal: Optimal Coping  Outcome: Met  Goal: Absence of Bleeding  Outcome: Met  Goal: Blood Glucose Level Within Targeted Range  Outcome: Met  Goal: Absence of Infection Signs and Symptoms  Outcome: Met  Goal: Optimal Nutrition Intake  Outcome: Met     Problem: Skin Injury Risk Increased  Goal: Skin Health and Integrity  Outcome: Met     Problem: Wound  Goal: Optimal Coping  Outcome: Met  Goal: Optimal Functional Ability  Outcome: Met  Goal: Absence of Infection Signs and Symptoms  Outcome: Met  Goal: Improved Oral Intake  Outcome: Met  Goal: Optimal Pain Control and Function  Outcome: Met  Goal: Skin Health and Integrity  Outcome: Met  Goal: Optimal Wound Healing  Outcome: Met     Problem: Infection  Goal: Absence of Infection Signs and Symptoms  Outcome: Met   POC has been discussed with pt.  Pt had an I/D done with Dr. Bryant on 4/18/2025 to her L hand.  Pain has been controlled with current regimen.  No falls during this shift.  Pt will be going home today with PO ABTs.  Potassium replacements were given this am.  Pt is adequate for discharge home today.

## 2025-04-19 NOTE — PROGRESS NOTES
Pharmacokinetic Assessment Follow Up: IV Vancomycin    Vancomycin serum concentration assessment(s):    The trough level was drawn correctly and can be used to guide therapy at this time. The measurement is below the desired definitive target range of 10 to 15 mcg/mL.    Vancomycin Regimen Plan:    Change regimen to Vancomycin 1250 mg IV every 12 hours with next serum trough concentration measured at 0800 prior to 4th dose on 4/20    Drug levels (last 3 results):  Recent Labs   Lab Result Units 04/17/25  0544 04/18/25  1900   Vancomycin Trough ug/ml 9.8* 9.6*       Pharmacy will continue to follow and monitor vancomycin.    Please contact pharmacy at extension [phone] for questions regarding this assessment.    Thank you for the consult,   Michel Nguyen       Patient brief summary:  Karen Gomez is a 50 y.o. female initiated on antimicrobial therapy with IV Vancomycin for treatment of skin & soft tissue infection    The patient's current regimen is vancomycin 1000mg q12h    Drug Allergies:   Review of patient's allergies indicates:  No Known Allergies    Actual Body Weight:   74.9 kg    Renal Function:   Estimated Creatinine Clearance: 80.6 mL/min (based on SCr of 0.8 mg/dL).,     Dialysis Method (if applicable):  N/A    CBC (last 72 hours):  Recent Labs   Lab Result Units 04/16/25  0540 04/17/25  0544 04/18/25  0349   WBC K/uL 13.83* 6.72 7.77   Hgb gm/dL 11.2* 11.4* 10.8*   Hct % 35.1* 35.3* 34.3*   Platelet Count K/uL 528* 470* 488*   Mono % % 7.9 7.3 6.0   Eos % % 2.4 5.1 4.6   Basophil % % 0.7 0.9 1.3       Metabolic Panel (last 72 hours):  Recent Labs   Lab Result Units 04/16/25  0540 04/17/25  0544 04/18/25  0349   Sodium mmol/L 136 135* 136   Potassium mmol/L 3.9 4.0 3.8   Chloride mmol/L 104 106 105   CO2 mmol/L 20* 21* 21*   Glucose mg/dL 119* 132* 121*   BUN mg/dL 13 11 12   Creatinine mg/dL 1.0 1.0 0.8   Albumin g/dL 3.7 3.2* 2.9*   Bilirubin Total mg/dL 0.3 0.2 0.1   ALP unit/L 32* 28* 27*   AST unit/L 21 22  27   ALT unit/L 17 14 13   Magnesium mg/dL 2.1 2.1 1.9   Phosphorus Level mg/dL 3.6 3.4 3.4       Vancomycin Administrations:  vancomycin given in the last 96 hours                     vancomycin (VANCOCIN) 1,000 mg in 0.9% NaCl 250 mL IVPB (admixture device) (mg) 1,000 mg New Bag 04/18/25 0727     1,000 mg New Bag 04/17/25 2025     1,000 mg New Bag  0817    vancomycin (VANCOCIN) 1,000 mg in 0.9% NaCl 250 mL IVPB (admixture device) (mg) 1,000 mg New Bag 04/16/25 1845    vancomycin 1,500 mg in 0.9% NaCl 250 mL IVPB (admixture device) (mg) 1,500 mg New Bag 04/16/25 0633                    Microbiologic Results:  Microbiology Results (last 7 days)       Procedure Component Value Units Date/Time    Culture, Anaerobe [[id number]] Collected: 04/18/25 1007    Order Status: Sent Specimen: Wound from Hand, Left Updated: 04/18/25 1119    Fungus culture [[id number]] Collected: 04/18/25 1007    Order Status: Sent Specimen: Wound from Hand, Left Updated: 04/18/25 1119    Aerobic culture [[id number]] Collected: 04/18/25 1007    Order Status: Sent Specimen: Wound from Hand, Left Updated: 04/18/25 1119    Blood Culture #1 [[id number]]  (Normal) Collected: 04/16/25 0540    Order Status: Completed Specimen: Blood from Peripheral, Forearm, Left Updated: 04/18/25 1000     CULTURE, BLOOD ([facility]) No Growth After 48 Hours    Blood Culture #2 [[id number]]  (Normal) Collected: 04/16/25 0540    Order Status: Completed Specimen: Blood from Peripheral, Hand, Left Updated: 04/18/25 1000     CULTURE, BLOOD ([facility]) No Growth After 48 Hours    Aerobic culture (Specify Source) **CANNOT BE ORDERED AS STAT** [[id number]]  (Abnormal)  (Susceptibility) Collected: 04/16/25 0535    Order Status: Completed Specimen: Abscess from Hand, Left Updated: 04/18/25 0732     CULTURE, AEROBIC Moderate Methicillin resistant Staphylococcus aureus

## 2025-04-19 NOTE — CARE UPDATE
04/19/25 0742   Patient Assessment/Suction   Level of Consciousness (AVPU) alert   Respiratory Effort Unlabored   Expansion/Accessory Muscles/Retractions no use of accessory muscles;no retractions   Rhythm/Pattern, Respiratory unlabored;depth regular;pattern regular   PRE-TX-O2   Device (Oxygen Therapy) room air   SpO2 96 %   Pulse Oximetry Type Intermittent   $ Pulse Oximetry - Multiple Charge Pulse Oximetry - Multiple   Pulse 82   Resp 18   Aerosol Therapy   $ Aerosol Therapy Charges PRN treatment not required   Respiratory Treatment Status (SVN) PRN treatment not required   Incentive Spirometer   $ Incentive Spirometer Charges done independently per patient

## 2025-04-19 NOTE — PLAN OF CARE
Plan of care and medications discussed with the pt. Questions answered. Frequent rounds made. Pt able to turn herself in bed. Surgical dressing to left hand remains CDI. Vancomycin trough due 04/20/25 at 0800. IV antibiotics given. At this time the pt has remained free from falls and injury. Pain medication given twice with moderate relief met. Contact precautions in place. Overall the pt had an uneventful night.  Problem: Adult Inpatient Plan of Care  Goal: Plan of Care Review  Outcome: Ongoing  Goal: Patient-Specific Goal (Individualized)  Outcome: Ongoing  Goal: Absence of Hospital-Acquired Illness or Injury  Outcome: Ongoing  Goal: Optimal Comfort and Wellbeing  Outcome: Ongoing  Goal: Readiness for Transition of Care  Outcome: Ongoing     Problem: Sepsis/Septic Shock  Goal: Optimal Coping  Outcome: Ongoing  Goal: Absence of Bleeding  Outcome: Ongoing  Goal: Blood Glucose Level Within Targeted Range  Outcome: Ongoing  Goal: Absence of Infection Signs and Symptoms  Outcome: Ongoing  Goal: Optimal Nutrition Intake  Outcome: Ongoing     Problem: Skin Injury Risk Increased  Goal: Skin Health and Integrity  Outcome: Ongoing     Problem: Wound  Goal: Optimal Coping  Outcome: Ongoing  Goal: Optimal Functional Ability  Outcome: Ongoing  Goal: Absence of Infection Signs and Symptoms  Outcome: Ongoing  Goal: Improved Oral Intake  Outcome: Ongoing  Goal: Optimal Pain Control and Function  Outcome: Ongoing  Goal: Skin Health and Integrity  Outcome: Ongoing  Goal: Optimal Wound Healing  Outcome: Ongoing     Problem: Infection  Goal: Absence of Infection Signs and Symptoms  Outcome: Ongoing

## 2025-04-19 NOTE — PLAN OF CARE
**Cleared for discharge from CM standpoint**     04/19/25 1325   Final Note   Assessment Type Final Discharge Note   Anticipated Discharge Disposition Home   What phone number can be called within the next 1-3 days to see how you are doing after discharge? [phone]   Hospital Resources/Appts/Education Provided Provided patient/caregiver with written discharge plan information;Appointments scheduled and added to AVS   Post-Acute Status   Discharge Delays None known at this time

## 2025-04-19 NOTE — ASSESSMENT & PLAN NOTE
MRSA.  -S/p I&D in the ED followed by Orthopedic Surgery on 4/18  -Wound Care follow up  -Clindamycin to complete two week course

## 2025-04-19 NOTE — DISCHARGE SUMMARY
"[facility]  Discharge Summary      Patient Name: Karen Gomez  MRN: [id number]  ELADIO: [id number]  Patient Class: IP- Inpatient  Admission Date: 4/16/2025  Hospital Length of Stay: 1 days  Discharge Date and Time: No discharge date for patient encounter.  Attending Physician: Ketan Dupont MD   Discharging Provider: Ketan Dupont MD  Primary Care Provider: Negra, Primary Doctor    Primary Care Team: Networked reference to record PCT     HPI:   Karen Gomez is a 51 y/o F with a past medical history of HTN, not on chronic antihypertensives, who presented to the ED with abscess to the left hand.  She states that she had "a spider bite" to that location that she noticed about a week ago.  At that time she was in [place] and there was salt water exposure to the wound.  She reports that night she noticed redness and some swelling to the left hand.  3 days later she was seen in the ED for this problem.  Hospital admission was recommended at that time; however, patient declined.  She was placed on oral bactrim and oral cipro with which she reports compliance; however, erythema and swelling have continued and an obvious abscess has formed.  I and D performed in the ED with moderate purulent drainage, cultures obtained.  Hospital medicine to admit for continued medical management.       Procedure(s) (LRB):  INCISION AND DRAINAGE, HAND (Left)      Hospital Course:   Karen Gomez is a 50 year old female with a past medical history of obesity and nicotine dependence who presented with a MRSA abscess to the left hand. She underwent I and D in the ED followed by further drainage in the OR 4/18 with Orthopedic Surgery. Cultures show MRSA. She is on vancomycin (failed prior oral antibiotic therapy). Inflammatory markers improved during her course. She was discharged 4/19 with clindamycin to complete a two week course and will follow up with Primary Care and Wound Care.     Goals of Care " "Treatment Preferences:  Code Status: Full Code      SDOH Screening:  The patient declined to be screened for utility difficulties, food insecurity, transport difficulties, housing insecurity, and interpersonal safety, so no concerns could be identified this admission.     Consults:   Consults (From admission, onward)          Status Ordering Provider     Inpatient consult to Midline team  Once        Provider:  (Not yet assigned)    Completed JOANNA CORNELIUS     Inpatient consult to Orthopedic Surgery  Once        Provider:  Asael Holley MD    Completed JOANNA CORNELIUS     Inpatient consult to Wound Care Physician  Once        Provider:  Diego Powell DO    Completed RADHA HARRELL     Pharmacy to dose Vancomycin consult  Once        Provider:  (Not yet assigned)   Placed in "And" Linked Group    Acknowledged ASAEL HOLLEY            Assessment & Plan  Abscess of left hand  MRSA.  -S/p I&D in the ED followed by Orthopedic Surgery on 4/18  -Wound Care follow up  -Clindamycin to complete two week course    Primary hypertension  -Amlodipine 10 mg  -Follow up with PCP  Nicotine dependence  Dangers of vaping with nicotine were reviewed with patient in detail. Patient was Counseled for 3-10 minutes. Nicotine replacement options were discussed. Nicotine replacement was discussed- not prescribed per patient's request  Obesity (BMI 30-39.9)  Body mass index is 30.2 kg/m². Morbid obesity complicates all aspects of disease management from diagnostic modalities to treatment.       Anemia  Chronic. Stable.  Final Active Diagnoses:    Diagnosis Date Noted POA    PRINCIPAL PROBLEM:  Abscess of left hand [L02.512] 04/16/2025 Yes    Obesity (BMI 30-39.9) [E66.9] 04/19/2025 Yes    Anemia [D64.9] 04/19/2025 Yes    Primary hypertension [I10] 04/16/2025 Yes    Nicotine dependence [F17.200] 04/16/2025 Yes      Problems Resolved During this Admission:    Diagnosis Date Noted Date Resolved POA    Sepsis " [A41.9] 04/16/2025 04/19/2025 Yes       Discharged Condition: stable    Disposition: Home or Self Care    Follow Up:   Follow-up Information       Humberto Sullivan FNP Follow up on 4/30/2025.    Specialty: Family Medicine  Why: Hospital follow up 4/30/25 4:00pm  Contact information:  [address]  [address]  [place] [address]  [phone]               Diego Powell, DO Follow up.    Specialty: Wound Care  Contact information:  [address]  [address]  [place] [address]  [phone]                           Patient Instructions:      Diet Cardiac     Notify your health care provider if you experience any of the following:  increased confusion or weakness     Notify your health care provider if you experience any of the following:  persistent dizziness, light-headedness, or visual disturbances     Notify your health care provider if you experience any of the following:  severe persistent headache     Notify your health care provider if you experience any of the following:  difficulty breathing or increased cough     Notify your health care provider if you experience any of the following:  severe uncontrolled pain     Notify your health care provider if you experience any of the following:  persistent nausea and vomiting or diarrhea     Notify your health care provider if you experience any of the following:  temperature >100.4     Notify your health care provider if you experience any of the following:  redness, tenderness, or signs of infection (pain, swelling, redness, odor or green/yellow discharge around incision site)     Notify your health care provider if you experience any of the following:  worsening rash     Leave dressing on - Keep it clean, dry, and intact until clinic visit     Reason for not Ordering Smoking Cessation Referral     Order Specific Question Answer Comments   Reason for not ordering: Not medically appropriate at this time      Reason for not Prescribing Nicotine Replacement      Order Specific Question Answer Comments   Reason for not Prescribing: Not medically appropriate at this time      Activity as tolerated       Significant Diagnostic Studies: Labs: All labs within the past 24 hours have been reviewed    Pending Diagnostic Studies:       None           Medications:  Reconciled Home Medications:      Medication List        START taking these medications      amLODIPine 10 MG tablet  Commonly known as: NORVASC  Take 1 tablet (10 mg total) by mouth once daily.  Start taking on: April 20, 2025     clindamycin 300 MG capsule  Commonly known as: CLEOCIN  Take 1 capsule (300 mg total) by mouth every 6 (six) hours. for 10 days            CONTINUE taking these medications      HYDROcodone-acetaminophen 7.5-325 mg per tablet  Commonly known as: NORCO  Take 1 tablet by mouth every 8 (eight) hours as needed for Pain.     ibuprofen 600 MG tablet  Commonly known as: ADVIL,MOTRIN  Take 1 tablet (600 mg total) by mouth every 6 (six) hours as needed for Pain.     mupirocin 2 % ointment  Commonly known as: BACTROBAN  Apply topically 3 (three) times daily.     ondansetron 4 MG Tbdl  Commonly known as: ZOFRAN-ODT  Take 1 tablet (4 mg total) by mouth every 8 (eight) hours as needed.            STOP taking these medications      ciprofloxacin HCl 500 MG tablet  Commonly known as: CIPRO     sulfamethoxazole-trimethoprim 800-160mg 800-160 mg Tab  Commonly known as: BACTRIM DS              Indwelling Lines/Drains at time of discharge:   Lines/Drains/Airways       None                   Time spent on the discharge of patient: 34 minutes         Ketan Dupont MD  Department of Hospital Medicine  [facility]

## 2025-04-19 NOTE — NURSING
Pt discharged to home with friend at side.  Pt rolled down with w/c to vehicle.  Pt verbalized understanding of discharge orders.   no c/o pain/nausea/vomitting.  Will CTM.

## 2025-04-19 NOTE — ASSESSMENT & PLAN NOTE
Body mass index is 30.2 kg/m². Morbid obesity complicates all aspects of disease management from diagnostic modalities to treatment.

## 2025-04-20 LAB — BACTERIA SPEC AEROBE CULT: ABNORMAL

## 2025-04-21 LAB
BACTERIA BLD CULT: NORMAL
BACTERIA BLD CULT: NORMAL
BACTERIA SPEC ANAEROBE CULT: NORMAL

## 2025-04-30 ENCOUNTER — OFFICE VISIT (OUTPATIENT)
Facility: CLINIC | Age: 50
End: 2025-04-30
Payer: COMMERCIAL

## 2025-04-30 ENCOUNTER — PATIENT MESSAGE (OUTPATIENT)
Dept: CASE MANAGEMENT | Facility: HOSPITAL | Age: 50
End: 2025-04-30

## 2025-04-30 VITALS
HEART RATE: 82 BPM | OXYGEN SATURATION: 99 % | DIASTOLIC BLOOD PRESSURE: 82 MMHG | TEMPERATURE: 98 F | SYSTOLIC BLOOD PRESSURE: 150 MMHG | WEIGHT: 160.5 LBS | BODY MASS INDEX: 29.53 KG/M2 | HEIGHT: 62 IN

## 2025-04-30 DIAGNOSIS — F17.200 NICOTINE DEPENDENCE, UNCOMPLICATED, UNSPECIFIED NICOTINE PRODUCT TYPE: ICD-10-CM

## 2025-04-30 DIAGNOSIS — B37.0 ORAL CANDIDIASIS: ICD-10-CM

## 2025-04-30 DIAGNOSIS — L02.512 ABSCESS OF LEFT HAND: Primary | ICD-10-CM

## 2025-04-30 DIAGNOSIS — I10 PRIMARY HYPERTENSION: ICD-10-CM

## 2025-04-30 PROCEDURE — 99999 PR PBB SHADOW E&M-EST. PATIENT-LVL IV: CPT | Mod: PBBFAC,,,

## 2025-04-30 RX ORDER — NYSTATIN 100000 [USP'U]/ML
4 SUSPENSION ORAL 4 TIMES DAILY
Qty: 160 ML | Refills: 0 | Status: SHIPPED | OUTPATIENT
Start: 2025-04-30 | End: 2025-05-10

## 2025-04-30 NOTE — PROGRESS NOTES
Subjective:  Chief Complaint   Patient presents with    Hospital Follow Up       History of Present Illness    Transitional Care Note    Family and/or Caretaker present at visit?  Yes.  Diagnostic tests reviewed/disposition: No diagnosic tests pending after this hospitalization.  Disease/illness education: HTN education  Home health/community services discussion/referrals: Patient does not have home health established from hospital visit.  They do not need home health.  If needed, we will set up home health for the patient.   Establishment or re-establishment of referral orders for community resources: No other necessary community resources.   Discussion with other health care providers: No discussion with other health care providers necessary.     This 50 y.o. presents today for complaint of hospital follow-up for left hand abscess s/p I&D by orthopedic surgery on 4/18/25.      History of Present Illness    CHIEF COMPLAINT:  Patient presents for follow-up of a hand abscess that was surgically drained and to discuss tongue soreness following IV antibiotic treatment.    HPI:  Patient reports a history of an abscess on her hand that developed rapidly over 3 days after playing [place] the sand at the beach 2 weeks ago. The abscess started swelling within an hour of leaving the beach. Initially, she attempted oral antibiotics at home, but when the condition did not improve, she was admitted to the hospital the following Monday. The abscess, which had grown to the size of a golf ball, was surgically drained.    Currently, she states the hand has significantly improved. The packing came out on its own 2 days ago, but she still has stitches in place. She has been protecting the area with gauze and wrapping it between her fingers.    She also complains of severe tongue soreness, which she attributes to the IV antibiotics received during her hospital stay. She describes it as very painful and causing significant distress. She has  "been using OTC canker sore treatments for numbing, but reports that it causes a burning sensation when applied.    She has a history of high blood pressure, which she reports has been consistently elevated for years, without previous medical intervention. She mentions not taking her blood pressure medication today.    She denies current fever or chest pain.    MEDICATIONS:  Patient is on Amlodipine for high blood pressure, but she mentions not taking it on the day of the visit. She is also on an antibiotic, which she takes 4 times daily, with a few doses left to finish.    MEDICAL HISTORY:  Patient has a history of hypertension and mild anemia.    SURGICAL HISTORY:  She underwent an abscess drainage procedure approximately two weeks ago for an infected abscess on her hand.      SOCIAL HISTORY:  Smoking: Former smoker, started at age 28, quit 3 years ago      ROS:  General: -fever  ENT: +tongue pain, +mouth lesions           Review of Systems   Constitutional:  Negative for chills, fever and malaise/fatigue.   Cardiovascular:  Negative for chest pain and palpitations.       (D/C Summary)  Admission Date: 4/16/2025  Hospital Length of Stay: 1 days  Discharge Date and Time: No discharge date for patient encounter.  Attending Physician: Ketan Dupont MD   Discharging Provider: Ketan Dupont MD  Primary Care Provider: Negra, Primary Doctor     Primary Care Team: Networked reference to record PCT      HPI:   Karen Gomez is a 49 y/o F with a past medical history of HTN, not on chronic antihypertensives, who presented to the ED with abscess to the left hand.  She states that she had "a spider bite" to that location that she noticed about a week ago.  At that time she was in [place] and there was salt water exposure to the wound.  She reports that night she noticed redness and some swelling to the left hand.  3 days later she was seen in the ED for this problem.  Hospital admission was recommended at that time; however, " "patient declined.  She was placed on oral bactrim and oral cipro with which she reports compliance; however, erythema and swelling have continued and an obvious abscess has formed.  I and D performed in the ED with moderate purulent drainage, cultures obtained.  Hospital medicine to admit for continued medical management.         Procedure(s) (LRB):  INCISION AND DRAINAGE, HAND (Left)       Hospital Course:   Karen Gomez is a 50 year old female with a past medical history of obesity and nicotine dependence who presented with a MRSA abscess to the left hand. She underwent I and D in the ED followed by further drainage in the OR 4/18 with Orthopedic Surgery. Cultures show MRSA. She is on vancomycin (failed prior oral antibiotic therapy). Inflammatory markers improved during her course. She was discharged 4/19 with clindamycin to complete a two week course and will follow up with Primary Care and Wound Care.     Objective:    BP (!) 150/82 (BP Location: Right arm, Patient Position: Sitting)   Pulse 82   Temp 98 °F (36.7 °C) (Oral)   Ht 5' 2" (1.575 m)   Wt 72.8 kg (160 lb 7.9 oz)   LMP 04/12/2025   SpO2 99%   BMI 29.35 kg/m²  Body mass index is 29.35 kg/m².    BP Readings from Last 3 Encounters:   04/30/25 (!) 150/82   04/19/25 (!) 140/69   04/12/25 (!) 177/106       Wt Readings from Last 3 Encounters:   04/30/25 72.8 kg (160 lb 7.9 oz)   04/16/25 74.9 kg (165 lb 2 oz)   04/12/25 74.8 kg (165 lb)         Physical Exam  Constitutional:       General: She is not in acute distress.     Appearance: She is not ill-appearing.   HENT:      Mouth/Throat:      Pharynx: Posterior oropharyngeal erythema present.      Comments: Erythema present, small white patches consistent with oral candidiases  Cardiovascular:      Rate and Rhythm: Normal rate and regular rhythm.   Pulmonary:      Effort: Pulmonary effort is normal. No respiratory distress.      Breath sounds: Normal breath sounds.   Skin:     General: Skin is warm " and dry.      Comments: Wound to left hand with sutures intact- picture attached to chart   Neurological:      Mental Status: She is alert.      Comments: Responds appropriately to surroundings. Answers questions appropriately.     Psychiatric:         Mood and Affect: Mood normal.         Behavior: Behavior normal.         Thought Content: Thought content normal.         Judgment: Judgment normal.           Assessment/Plan:  1. Abscess of left hand  Assessment & Plan:  Healing well. Mild erythema noted- much improved from previous per patient. Denies fever or other systemic symptoms.  Continue Clindamycin until entire course completed.  Follow-up with surgeon for suture removal- 5/2/25.      2. Primary hypertension  Assessment & Plan:  BP elevated today- manual recheck 150/82  Has not taken Amlodipine today.  Stressed importance of compliance with BP medication and risks of uncontrolled HTN.  Take amlodipine daily as prescribed.  Check BP daily and keep log to bring to future apts.  Cardiac diet- low salt/low sodium.  Follow-up with PCP.      3. Nicotine dependence, uncomplicated, unspecified nicotine product type  Assessment & Plan:  Has 20 year history of smoking-Per patient, quit smoking 3 years ago.   Encouraged continued smoking cessation.  Follow-up with PCP.      4. Oral candidiasis  Assessment & Plan:  Nystatin oral QID for 10 days.  Educated patient to avoid consuming excess sugar.  Follow-up if symptoms worsen or fail to resolve.  -     nystatin (MYCOSTATIN) 100,000 unit/mL suspension; Take 4 mLs (400,000 Units total) by mouth 4 (four) times daily. for 10 days  Dispense: 160 mL; Refill: 0         Orders Placed This Encounter    nystatin (MYCOSTATIN) 100,000 unit/mL suspension       Patient Instructions   Establish care with PCP:     Access [address]  [place] [address] [phone]     [facility]  Address: [address] [place] [address]  Phone: [phone]      Follow up if symptoms worsen or fail to improve.    This note was generated with the assistance of ambient listening technology. Verbal consent was obtained by the patient and accompanying visitor(s) for the recording of patient appointment to facilitate this note. I attest to having reviewed and edited the generated note for accuracy, though some syntax or spelling errors may persist. Please contact the author of this note for any clarification.

## 2025-04-30 NOTE — ASSESSMENT & PLAN NOTE
Healing well. Mild erythema noted- much improved from previous per patient. Denies fever or other systemic symptoms.  Continue Clindamycin until entire course completed.  Follow-up with surgeon for suture removal- 5/2/25.

## 2025-04-30 NOTE — ASSESSMENT & PLAN NOTE
Has 20 year history of smoking-Per patient, quit smoking 3 years ago.   Encouraged continued smoking cessation.  Follow-up with PCP.

## 2025-04-30 NOTE — PATIENT INSTRUCTIONS
Establish care with PCP:     Access [address] [address]  [phone]     [facility]  Address: [address], [place] [address]  Phone: [phone]

## 2025-04-30 NOTE — ASSESSMENT & PLAN NOTE
BP elevated today- manual recheck 150/82  Has not taken Amlodipine today.  Stressed importance of compliance with BP medication and risks of uncontrolled HTN.  Take amlodipine daily as prescribed.  Check BP daily and keep log to bring to future apts.  Cardiac diet- low salt/low sodium.  Follow-up with PCP.

## 2025-05-02 ENCOUNTER — OFFICE VISIT (OUTPATIENT)
Dept: ORTHOPEDICS | Facility: CLINIC | Age: 50
End: 2025-05-02
Payer: COMMERCIAL

## 2025-05-02 VITALS — WEIGHT: 160.5 LBS | HEIGHT: 62 IN | BODY MASS INDEX: 29.53 KG/M2

## 2025-05-02 DIAGNOSIS — L02.512 ABSCESS OF LEFT HAND: Primary | ICD-10-CM

## 2025-05-02 PROCEDURE — 99999 PR PBB SHADOW E&M-EST. PATIENT-LVL III: CPT | Mod: PBBFAC,,, | Performed by: ORTHOPAEDIC SURGERY

## 2025-05-02 NOTE — PROGRESS NOTES
Post-op Note    HPI    Karen Gomez is here 2 weeks s/p the following procedure:     Left hand I and D for MRSA infection    Overall doing well. Pain controlled on current regimen.  Taking oral antibiotics.  No significant pain today.  No drainage today.    Physical Exam:     Patient is alert and oriented no acute distress.       Left dorsal ulnar hand Incision(s) are well healed.  There is no evidence of dehiscence.  Some maceration of the skin edges but well approximated.  No palpable abscess.  No significant induration or erythema.    Imaging:     I have personally reviewed the following imaging and these are an interpretation of my findings:     None    Assessment    Karen Gomez is 2 weeks Post-op left hand I and D    Plan:    Overall doing well.  We discussed daily antibacterial soap use for MRSA.  We also discussed mupirocin nasal cream.  No acute signs or symptoms of residual infection today.  Keep wound clean and dry.  Follow up PRN

## 2025-05-02 NOTE — PHYSICIAN QUERY
Please provide further clarification on the procedure performed on the site:  Excisional debridement of subcutaneous tissue and/or fascia

## 2025-05-09 LAB — FUNGUS SPEC CULT: NORMAL

## (undated) DEVICE — GLOVE SENSICARE PI GRN 8.5

## (undated) DEVICE — ELECTRODE MEGADYNE RETURN DUAL

## (undated) DEVICE — DRAPE THREE-QTR REINF 53X77IN

## (undated) DEVICE — PACK CUSTOM UNIV BASIN SLI

## (undated) DEVICE — SUT ETHICON 3-0 BLK MONO PS

## (undated) DEVICE — BANDAGE ESMARK ELASTIC ST 4X9

## (undated) DEVICE — SOL NACL IRR 1000ML BTL

## (undated) DEVICE — TOWEL OR DISP STRL BLUE 4/PK

## (undated) DEVICE — DRAPE U SPLIT SHEET 54X76IN

## (undated) DEVICE — GLOVE SENSICARE PI ALOE 8

## (undated) DEVICE — SLEEVE SCD EXPRESS KNEE MEDIUM

## (undated) DEVICE — LINER SUCTION 3000CC

## (undated) DEVICE — STRAP OR TABLE 5IN X 72IN

## (undated) DEVICE — GOWN POLY REINF BRTH SLV XL

## (undated) DEVICE — KIT COLLECTION E SWAB REGULAR

## (undated) DEVICE — DRAPE INVISISHIELD TOWEL SMALL

## (undated) DEVICE — Device

## (undated) DEVICE — DRAPE HAND STERILE

## (undated) DEVICE — GLOVE SENSICARE PI GRN 8

## (undated) DEVICE — DRESSING XEROFORM NONADH 1X8IN

## (undated) DEVICE — YANKAUER OPEN TIP W/O VENT

## (undated) DEVICE — SPONGE BULKEE II ABSRB 6X6.75